# Patient Record
Sex: MALE | Race: WHITE
[De-identification: names, ages, dates, MRNs, and addresses within clinical notes are randomized per-mention and may not be internally consistent; named-entity substitution may affect disease eponyms.]

---

## 2020-04-05 NOTE — RAD
Chest one view



HISTORY: Fever. Dyspnea.



COMPARISON: 11/5/2008.



FINDINGS: Cardiac silhouette and pulmonary vasculature are unremarkable. Mediastinum is midline.



No confluent airspace consolidation or evidence of pneumothorax. Parenchymal markings in the lower lo
bes is similar in appearance to the prior study.



  



IMPRESSION :

No active cardiopulmonary abnormalities are demonstrated.



Reported By: KECIA Schwab 

Electronically Signed:  4/5/2020 2:45 PM

## 2020-07-30 NOTE — CT
CT maxillofacial with contrast:

7/30/2020



HISTORY:

65-year-old male with facial pain and swelling



COMPARISON:

None



FINDINGS:

There is a large soft tissue tumor mass in the left face invading multiple compartments. This include
s the main portion of the mass centered in the left premaxillary superficial soft tissues that

measures approximately 4 x 4 0.5 x 5 cm. It contiguously extends posteriorly deep into the left maxil
alexandria sinus, through a large destructive osseous defect of the anterior wall of the left maxillary

sinus, occupying approximately 50-75% volume of the lateral aspect of the left maxillary sinus. There
 is a large left medial antrostomy defect, with surgical absence of the left inferior turbinate.

Nasal cavity and right maxillary sinus are essentially clear.



From the left maxillary sinus, the mass extends inferiorly, destroying the bony floor of the left max
illary sinus and the posterior left lateral aspect of the hard palate, encroaching upon the

superior lateral aspect of the left oral cavity, where the tumor component is approximately 2 x 1.5 x
 2.5 cm.



There is a prosthetic globe in the left orbit. There is osseous defect at the left inferior orbital w
all laterally. The tumor material from the left maxillary sinus is flush with that orbital floor

defect, minimally encroaching upon the inferior extraconal space..



The tumor mass apparently covers the anterior surface of the prosthetic globe, such that the prosthes
is is probably displaced posteriorly within the orbit.



There is soft tissue edema demonstrated by fat stranding, around the tumor, especially superficially 
in the subcutaneous fat.



Moderate mucosal thickening partially opacifying bilateral ethmoid air cells and left frontal sinus.

No intracranial invasion identified.

Jaws are edentulous.



IMPRESSION:

1. 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
g left maxillary sinus and left orbit, and left oral cavity; and destroying bone.

2. Soft tissue edema surrounding the tumor.

3. Prosthetic left globe.



Reported By: Baudilio Lancaster 

Electronically Signed:  7/30/2020 12:49 PM
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
## 2020-09-02 NOTE — PDOC.HOSPP
- Subjective


Encounter Date: 09/02/20


Encounter Time: 10:30


Subjective: 





Mr. Weinstein was seen today in follow-up of Facial cellulitis. He notes throbbing 

pain in the left side of his face.





- Objective


Vital Signs & Weight: 


 Vital Signs (12 hours)











  Temp Pulse Resp BP Pulse Ox


 


 09/02/20 08:17  97.5 F L  55 L  18  109/53 L  97


 


 09/02/20 05:16      100


 


 09/02/20 04:33  97.4 F L  66  20  113/60  100








 Weight











Weight                         155 lb 9.6 oz














Result Diagrams: 


 09/01/20 23:00





 09/01/20 23:00





Hospitalist ROS





- Medication


Medications: 


Active Medications











Generic Name Dose Route Start Last Admin





  Trade Name Freq  PRN Reason Stop Dose Admin


 


Sodium Chloride  1,000 mls @ 125 mls/hr  09/02/20 03:30  09/02/20 05:36





  Normal Saline 0.9%  IV   1,000 mls





  .Q8H KAT   Administration





     





     





     





     


 


Morphine Sulfate  4 mg  09/02/20 03:28  09/02/20 05:38





  Morphine  SLOW IVP   4 mg





  Q4H PRN   Administration





  Pain   





     





     





     


 


Pantoprazole Sodium  40 mg  09/02/20 09:00  09/02/20 08:32





  Protonix  IVP   40 mg





  DAILY KAT   Administration





     





     





     





     














- Exam


General Appearance: NAD


Eye - other findings: + erythema on the left face, and swelling, enucleation of 

left eye


Heart: RRR, no murmur, no gallops, no rubs, normal peripheral pulses


Respiratory: CTAB, no wheezes, no rales, no ronchi, normal chest expansion


Gastrointestinal: soft, non-tender, non-distended, normal bowel sounds, no 

palpable masses, no hepatomegaly


Extremities: no cyanosis, 1+ LE edema (mild lower extremity edema in both legs)





Hosp A/P


(1) Cellulitis diffuse, face


Code(s): L03.211 - CELLULITIS OF FACE   Status: Acute   





(2) Malignant neoplasm of face


Code(s): C76.0 - MALIGNANT NEOPLASM OF HEAD, FACE AND NECK   Status: Acute   





(3) Tobacco abuse


Code(s): Z72.0 - TOBACCO USE   Status: Chronic   





- Plan





* Cellulitis of the face- he has had failed outpatient treatment with Augmentin 

and Doxycycline


* Continue Cefepime and Vancomycin' ENT has been consulted


* He normally receives treatment at [facility]. His ENT there is Dr. White. He would 

like transfer to [facility]- I have called the transfer center to initiate transfer


* Tobacco Abuse- continued use

## 2020-09-03 NOTE — CON
DATE OF CONSULTATION:  



REASON FOR CONSULTATION:  This 65-year-old gentleman we are consulted on for

evaluation of a large nasal mass. 



BRIEF HISTORY:  This is a gentleman who had a history of recurrent inverting

papilloma.  This causes severe facial deformity and has even required removal 
of the

left orbit along with major craniofacial surgery 2 prior times.  He reports 
being

followed in town by Dr. White who has communicated him by phone over the past 
day

regarding his condition.  His facial mass and pain have become significantly 
worse

since February.  Normally, the patient is treated and has his surgical 
procedures at

[facility].  However, the patient was admitted for severe pain 
exacerbation.

He is currently COVID positive and transfer to [place] has been pending likely

related to his COVID status. 



PHYSICAL EXAMINATION:

The patient is resting comfortably in bed.  He has left orbital exoneration 
along

with obvious subcutaneous tumor growth of the left cheek area causing overlying

redness and swelling.  Left nasal cavity  large friable nasal mass. 



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

area.  The patient is already currently under the care of Dr. Maico White in 
town.

 At this time in the hospital, I recommend pain control and oral antibiotics.  
The

patient will likely need to be discharged pending his COVID status and to 
follow up

with [facility] via a regularly scheduled appointment as

soon as possible pending his negative COVID status.  However, if his symptoms

continue to worsen, no operative procedure we could offer him at this 
institution.

Please do not hesitate to contact us for any questions, [phone]. 







Job ID:  [id number]



MTDD

## 2020-09-03 NOTE — PDOC.HOSPP
- Subjective


Encounter Date: 09/03/20


Encounter Time: 14:09


Subjective: 





Mr. Weinstein was seen today in follow-up of cellulitis of the face. He notes some 

continued pain, which he says Norco is not sufficient.  His COVID screen came 

back positive. He denies any symptoms, including chest pain, shortness of 

breath diarrhea ect. He says he had symptoms like this a few weeks ago, but 

tested negative at that time.





- Objective


Vital Signs & Weight: 


 Vital Signs (12 hours)











  Temp Pulse Resp BP Pulse Ox


 


 09/03/20 12:00  97.5 F L  70  18  99/53 L  96


 


 09/03/20 08:44  98.1 F  68  18  95/58 L  93 L


 


 09/03/20 08:05  98.1 F  68  18  95/58 L  93 L


 


 09/03/20 08:00      93 L


 


 09/03/20 04:00  98.2 F  64  18  95/57 L  96








 Weight











Admit Weight                   155 lb 9.6 oz


 


Weight                         155 lb 9.6 oz














I&O: 


 











 09/02/20 09/03/20 09/04/20





 06:59 06:59 06:59


 


Intake Total  1850 400


 


Output Total   1075


 


Balance  1850 -675











Result Diagrams: 


 09/03/20 05:39





 09/03/20 05:39





Hospitalist ROS





- Medication


Medications: 


Active Medications











Generic Name Dose Route Start Last Admin





  Trade Name Freq  PRN Reason Stop Dose Admin


 


Cefepime HCl 1 gm/ Sodium  100 mls @ 200 mls/hr  09/02/20 15:00  09/03/20 03:33





  Chloride  IVPB   100 mls





  0300,1500 KAT   Administration





     





     





     





     


 


Sodium Chloride  1,000 mls @ 125 mls/hr  09/02/20 03:30  09/03/20 12:12





  Normal Saline 0.9%  IV   1,000 mls





  .Q8H KAT   Administration





     





     





     





     


 


Vancomycin HCl 1.25 gm/ Sodium  250 mls @ 166.667 mls/hr  09/03/20 11:00  09/03/ 20 12:12





  Chloride  IVPB   250 mls





  1100,2300 KAT   Administration





     





     





     





     


 


Morphine Sulfate  4 mg  09/02/20 03:28  09/03/20 09:39





  Morphine  SLOW IVP   4 mg





  Q4H PRN   Administration





  Pain   





     





     





     


 


Pantoprazole Sodium  40 mg  09/02/20 09:00  09/03/20 09:29





  Protonix  IVP   40 mg





  DAILY KAT   Administration





     





     





     





     














- Exam


ENT - other findings: + erythema on the left side of the face, and some swelling


Heart: RRR, no murmur, no gallops, no rubs, normal peripheral pulses


Respiratory: CTAB, no wheezes, no rales, no ronchi, normal chest expansion


Gastrointestinal: soft, non-tender, non-distended, normal bowel sounds, no 

palpable masses, no hepatomegaly


Extremities: no cyanosis, no edema





Hosp A/P


(1) Cellulitis diffuse, face


Code(s): L03.211 - CELLULITIS OF FACE   Status: Acute   





(2) Malignant neoplasm of face


Code(s): C76.0 - MALIGNANT NEOPLASM OF HEAD, FACE AND NECK   Status: Acute   





(3) Tobacco abuse


Code(s): Z72.0 - TOBACCO USE   Status: Chronic   





- Plan





* Cellulitis of the face- Continue Cefepime and Vancomycin


* He will continue with treatment for the cellulitis here, and then once 

improved, he can be dicsharged and follow-up with ENT in [place] at [facility]


* COVID infection- unclear if he is an asymptomatic acute infection, vs. a 

resolved infection, with persistent viral shedding. He has been placed in 

Respiratory isolation


* Tobacco Abuse- continued use

## 2020-09-04 NOTE — PDOC.HOSPP
- Subjective


Encounter Date: 09/04/20


Encounter Time: 15:38


Subjective: 





Mr. Weinstein was seen today in follow-up of cellulitis of the face. He is beginning 

to feel better. No new complaints.





- Objective


Vital Signs & Weight: 


 Vital Signs (12 hours)











  Temp Pulse Resp BP Pulse Ox


 


 09/04/20 08:00      96


 


 09/04/20 04:00  97.9 F  74  18  111/61  96








 Weight











Admit Weight                   155 lb 9.6 oz


 


Weight                         155 lb 9.6 oz














I&O: 


 











 09/03/20 09/04/20 09/05/20





 06:59 06:59 06:59


 


Intake Total 1850 4990 


 


Output Total  4475 1300


 


Balance 1850 515 -1300











Result Diagrams: 


 09/03/20 05:39





 09/03/20 05:39





Hospitalist ROS





- Medication


Medications: 


Active Medications











Generic Name Dose Route Start Last Admin





  Trade Name Freq  PRN Reason Stop Dose Admin


 


Cefepime HCl 1 gm/ Sodium  100 mls @ 200 mls/hr  09/02/20 15:00  09/04/20 14:06





  Chloride  IVPB   100 mls





  0300,1500 KAT   Administration





     





     





     





     


 


Sodium Chloride  1,000 mls @ 125 mls/hr  09/02/20 03:30  09/04/20 11:21





  Normal Saline 0.9%  IV   1,000 mls





  .Q8H KAT   Administration





     





     





     





     


 


Vancomycin HCl 1.25 gm/ Sodium  250 mls @ 166.667 mls/hr  09/03/20 11:00  09/04/ 20 11:20





  Chloride  IVPB   250 mls





  1100,2300 KAT   Administration





     





     





     





     


 


Morphine Sulfate  4 mg  09/02/20 03:28  09/04/20 10:13





  Morphine  SLOW IVP   4 mg





  Q4H PRN   Administration





  Pain   





     





     





     


 


Pantoprazole Sodium  40 mg  09/02/20 09:00  09/04/20 09:12





  Protonix  IVP   40 mg





  DAILY KAT   Administration





     





     





     





     














- Exam


Eye - other findings: + left sided facial swelling


Heart: RRR, no murmur, no gallops, no rubs, normal peripheral pulses


Respiratory: CTAB, no wheezes, no rales, no ronchi, normal chest expansion





Hosp A/P


(1) Cellulitis diffuse, face


Code(s): L03.211 - CELLULITIS OF FACE   Status: Acute   





(2) Malignant neoplasm of face


Code(s): C76.0 - MALIGNANT NEOPLASM OF HEAD, FACE AND NECK   Status: Acute   





(3) Tobacco abuse


Code(s): Z72.0 - TOBACCO USE   Status: Chronic   





- Plan





* Cellulitis of the face- he can be transitioned back to oral antibiotics


* Stable for discharge home
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
## 2020-10-09 NOTE — CT
CT Facial Bones W Con



History: Draining tumor



Comparison: CT July 30, 2020



Findings: Prosthetic left globe. The malignancy of the left infraorbital soft tissues has enlarged wi
th central necrosis with headaches sinus tract to the skin. Mass has communication of the nasal

cavity.



Using the same plane of reference the greatest transverse dimension is 5.8 cm, previously 4.5 cm. Inv
olvement of the left orbit soft tissues has mildly progressed. The tumor invades through the left

anterior maxillary wall and the left maxillary floor involving the left maxillary alveolar bone.



Impression: Slightly enlarging and progressively invading left facial mass with developing central ne
crosis which has a sinus tract to the skin surface.



Reported By: Doug Rojo 

Electronically Signed:  10/9/2020 9:20 PM

## 2020-10-21 NOTE — CT
EXAM:  CT Facial Bones W Con



DATE:  10/21/2020 8:55 PM



INDICATION:  Worsening facial swelling and drainage



COMPARISON:  Prior exam dated October 9, 2020



FINDING:  The large central necrotic left infraorbital soft tissue mass invading the left maxillary s
inus, left aspect of the oral cavity and left  space is relatively stable. Draining sinus

is seen communicating with the anterior left cheek. Prosthetic left globe is stable. Intracranial con
tents are within normal limits. Postprocedural change of the left maxillary sinus and left nasal

canal are stable.





IMPRESSION:Stable large invasive left infraorbital facial soft tissue mass with a draining sinus trac
t to the anterior left cheek skin.

 



Reported By: Elijah Frazier 

Electronically Signed:  10/21/2020 9:15 PM

## 2020-10-28 NOTE — CT
PRELIMINARY REPORT/[facility]/EMERGENCY AFTER HOURS PROCEDURE



EXAM: 

CTA Chest with Intravenous Contrast 



CLINICAL HISTORY: 

Dyspnea, recent admission, Cancer 



TECHNIQUE: 

Axial CTA images of the chest with intravenous contrast. Three-dimensional MIP/volume rendered reform
ations were performed. 



CONTRAST: 

With; ISOVUE 370,100mL 



COMPARISON: 

None provided. 



FINDINGS: 



PULMONARY ARTERIES 

Dilatation of the main pulmonary artery measuring up to 4.3 cm in diameter.  There is no intraluminal
 filling defect suspicious for PE. 



AORTA 

Dilatation of the ascending aorta measuring up to 4.3 cm.  Atherosclerosis. 



LUNGS 

Centrilobular emphysematous changes.  No pulmonary mass. No focal airspace consolidation. 



PLEURAL SPACES 

No pleural effusion. No pneumothorax. 



HEART AND MEDIASTINUM 

No cardiomegaly. No significant pericardial effusion.  The coronary arteries are calcified. 



LYMPH NODES 

No lymphadenopathy. 



BONES 

No focal osseous abnormality or acute fracture.  Multilevel degenerative disc disease. 



CHEST WALL AND UPPER ABDOMEN 

9 mm hypodense lesion in the right lobe of the liver that is too small to characterize by CT criteria
.  The chest wall is unremarkable. 



IMPRESSION: 



No evidence of a pulmonary embolism.  No identified acute chest abnormality. 



Centrilobular emphysematous changes.  Enlarged main pulmonary artery suggestive of pulmonary artery h
ypertension. 



Coronary artery disease. 



Ectasia of the ascending aorta.



 

ELECTRONICALLY SIGNED BY:

Earnest Cordoba MD

Oct 28, 2020 12:15:38 AM CDT



This report is intended for review by the ordering physician only, in accordance of law. If you recei
ve this report in error, please call [facility] at [phone].









FINAL REPORT 



Exam: CT angiogram of the chest



HISTORY: Cancer. Dyspnea.



COMPARISON: None





TECHNIQUE: CT angiogram of the chest is performed in the axial plane. Three-dimensional reformatted i
mages are submitted for interpretation



FINDINGS:

Mediastinum: No mass, lymphadenopathy or hematoma.



HEART: Normal size. No significant pericardial fluid. 

Aorta: No aneurysm or dissection 

Upper solid abdominal viscera: Indeterminate hypodensities in the hepatic parenchyma.

Trachea and central bronchi: Patent

Pleural spaces: No effusion



Lung parenchyma: Extensive emphysematous changes. There are large blebs and bulla predominantly in th
e upper lobes. Irregular marginated hypodensity in the right lung apex is presumed to represent

scar.

Pneumothorax: None

Osseous structures: No lytic or blastic lesions



Pulmonary arteries: Adequate contrast opacification pulmonary arterial system to the level of segment
al arteries. No filling defect to suggest pulmonary embolism. Enlarged central pulmonary artery,

consistent with pulmonary artery hypertension.



IMPRESSION:

1. This report is in agreement with initial report by [facility].

2. Extensive emphysematous changes.

3. No evidence of pulmonary artery  embolism to the level of the segmental arteries. There is pulmona
ry arterial hypertension.



Code QA



Transcribed Date/Time: 10/28/2020 7:48 AM



Reported By: Roby Lopez 

Electronically Signed:  10/28/2020 8:55 AM

## 2020-11-14 NOTE — CT
EXAM: CT face without contrast



HISTORY: Fall with facial trauma. Large left facial tumor



COMPARISON: CT face 10/21/2020



TECHNIQUE: Multiple contiguous axial images were obtained and a CT of the face without contrast. Sagi
ttal and coronal reformats were performed.



FINDINGS: No acute facial fractures are identified. There is stable remodeling of the left maxillary 
and zygomatic bones with absence of the anterior and medial walls of the left maxillary sinus.

There is a large soft tissue density mass emanating from the anterior aspect of the left cheek and ex
tending upward towards the orbit. There is periorbital soft tissue swelling which most likely

represents a mass but small amount of periorbital contusion is also a possibility. 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
cial left globe. The right globe is unremarkable. 



Mucosal thickening is seen in the left maxillary sinus, left frontal sinus, and bilateral ethmoid air
 cells. The sphenoid sinuses are well aerated. The mastoid air cells are well aerated.. 



Visualized intracranial structures are unremarkable. 



IMPRESSION: 

1. No evidence of acute facial fracture

2. Large stable periorbital/sinus/facial mass.



Reported By: Alcides Lopez 

Electronically Signed:  11/14/2020 9:31 PM

## 2020-11-14 NOTE — CT
EXAM: CT brain without contrast



HISTORY: Fall with left facial trauma



COMPARISON: CT face 10/21/2020



TECHNIQUE: Multiple contiguous axial images were obtained and a CT of the brain without contrast.



FINDINGS: There are scattered hypodensities in the subcortical and periventricular white matter consi
stent with small vessel ischemic disease. There is no evidence of hydrocephalus, intracranial

hemorrhage, or extra-axial fluid collection.



Moderate left periorbital soft tissue swelling is seen. The soft tissue swelling may represent a mass
 and/or hematoma. 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 Mucosal thickening is seen in the ethmoid air

cells and left frontal and maxillary sinuses. The patient appears to have had prior left maxillary si
nus surgery. The mastoid air cells are well aerated. Multiple lucencies in the left zygomatic bone

likely represent remote fractures.



IMPRESSION: No evidence of acute intracranial abnormality



Reported By: Alcides Lopez 

Electronically Signed:  11/14/2020 9:25 PM

## 2020-11-14 NOTE — RAD
CHEST ONE VIEW:

11/14/20

 

COMPARISON: 

4/5/20.

 

HISTORY: 

Fall. Pain. 

 

FINDINGS: 

Normal cardiac silhouette. The pulmonary vessels and hilum are normal. Costophrenic angles are clear.
 Chronic changes, without consolidation or mass. No pneumothorax or acute osseous abnormalities. 

 

IMPRESSION: 

No acute cardiopulmonary process. 

 

POS: PPP

## 2020-11-30 ENCOUNTER — HOSPITAL ENCOUNTER (INPATIENT)
Dept: HOSPITAL 92 - ERS | Age: 65
LOS: 3 days | Discharge: HOME | DRG: 844 | End: 2020-12-03
Attending: INTERNAL MEDICINE | Admitting: INTERNAL MEDICINE
Payer: SELF-PAY

## 2020-11-30 VITALS — BODY MASS INDEX: 23.9 KG/M2

## 2020-11-30 DIAGNOSIS — F15.10: ICD-10-CM

## 2020-11-30 DIAGNOSIS — F17.290: ICD-10-CM

## 2020-11-30 DIAGNOSIS — D36.7: Primary | ICD-10-CM

## 2020-11-30 DIAGNOSIS — Z79.899: ICD-10-CM

## 2020-11-30 DIAGNOSIS — D64.9: ICD-10-CM

## 2020-11-30 DIAGNOSIS — D72.829: ICD-10-CM

## 2020-11-30 DIAGNOSIS — K59.00: ICD-10-CM

## 2020-11-30 DIAGNOSIS — Z20.828: ICD-10-CM

## 2020-11-30 DIAGNOSIS — Z79.82: ICD-10-CM

## 2020-11-30 DIAGNOSIS — R13.10: ICD-10-CM

## 2020-11-30 DIAGNOSIS — J44.9: ICD-10-CM

## 2020-11-30 DIAGNOSIS — Z59.0: ICD-10-CM

## 2020-11-30 DIAGNOSIS — Z79.1: ICD-10-CM

## 2020-11-30 DIAGNOSIS — L03.211: ICD-10-CM

## 2020-11-30 LAB
ALBUMIN SERPL BCG-MCNC: 3.5 G/DL (ref 3.4–4.8)
ALP SERPL-CCNC: 112 U/L (ref 40–110)
ALT SERPL W P-5'-P-CCNC: 10 U/L (ref 8–55)
ANION GAP SERPL CALC-SCNC: 13 MMOL/L (ref 10–20)
AST SERPL-CCNC: 13 U/L (ref 5–34)
BASOPHILS # BLD AUTO: 0.1 THOU/UL (ref 0–0.2)
BASOPHILS NFR BLD AUTO: 0.5 % (ref 0–1)
BILIRUB SERPL-MCNC: 0.2 MG/DL (ref 0.2–1.2)
BUN SERPL-MCNC: 10 MG/DL (ref 8.4–25.7)
CALCIUM SERPL-MCNC: 9.7 MG/DL (ref 7.8–10.44)
CHLORIDE SERPL-SCNC: 105 MMOL/L (ref 98–107)
CO2 SERPL-SCNC: 22 MMOL/L (ref 23–31)
CREAT CL PREDICTED SERPL C-G-VRATE: 0 ML/MIN (ref 70–130)
EOSINOPHIL # BLD AUTO: 0.8 THOU/UL (ref 0–0.7)
EOSINOPHIL NFR BLD AUTO: 5.8 % (ref 0–10)
GLOBULIN SER CALC-MCNC: 3.9 G/DL (ref 2.4–3.5)
GLUCOSE SERPL-MCNC: 108 MG/DL (ref 80–115)
HGB BLD-MCNC: 10.7 G/DL (ref 14–18)
LYMPHOCYTES # BLD: 2.1 THOU/UL (ref 1.2–3.4)
LYMPHOCYTES NFR BLD AUTO: 14.9 % (ref 21–51)
MCH RBC QN AUTO: 27.1 PG (ref 27–31)
MCV RBC AUTO: 86.9 FL (ref 78–98)
MONOCYTES # BLD AUTO: 0.7 THOU/UL (ref 0.11–0.59)
MONOCYTES NFR BLD AUTO: 4.6 % (ref 0–10)
NEUTROPHILS # BLD AUTO: 10.6 THOU/UL (ref 1.4–6.5)
NEUTROPHILS NFR BLD AUTO: 74.3 % (ref 42–75)
PLATELET # BLD AUTO: 835 THOU/UL (ref 130–400)
POTASSIUM SERPL-SCNC: 3.8 MMOL/L (ref 3.5–5.1)
RBC # BLD AUTO: 3.94 MILL/UL (ref 4.7–6.1)
SODIUM SERPL-SCNC: 136 MMOL/L (ref 136–145)
WBC # BLD AUTO: 14.3 THOU/UL (ref 4.8–10.8)

## 2020-11-30 PROCEDURE — 83605 ASSAY OF LACTIC ACID: CPT

## 2020-11-30 PROCEDURE — 96376 TX/PRO/DX INJ SAME DRUG ADON: CPT

## 2020-11-30 PROCEDURE — 80053 COMPREHEN METABOLIC PANEL: CPT

## 2020-11-30 PROCEDURE — 74176 CT ABD & PELVIS W/O CONTRAST: CPT

## 2020-11-30 PROCEDURE — 96367 TX/PROPH/DG ADDL SEQ IV INF: CPT

## 2020-11-30 PROCEDURE — 96365 THER/PROPH/DIAG IV INF INIT: CPT

## 2020-11-30 PROCEDURE — 80202 ASSAY OF VANCOMYCIN: CPT

## 2020-11-30 PROCEDURE — 96375 TX/PRO/DX INJ NEW DRUG ADDON: CPT

## 2020-11-30 PROCEDURE — 87040 BLOOD CULTURE FOR BACTERIA: CPT

## 2020-11-30 PROCEDURE — 85025 COMPLETE CBC W/AUTO DIFF WBC: CPT

## 2020-11-30 PROCEDURE — 87635 SARS-COV-2 COVID-19 AMP PRB: CPT

## 2020-11-30 PROCEDURE — 80048 BASIC METABOLIC PNL TOTAL CA: CPT

## 2020-11-30 PROCEDURE — U0003 INFECTIOUS AGENT DETECTION BY NUCLEIC ACID (DNA OR RNA); SEVERE ACUTE RESPIRATORY SYNDROME CORONAVIRUS 2 (SARS-COV-2) (CORONAVIRUS DISEASE [COVID-19]), AMPLIFIED PROBE TECHNIQUE, MAKING USE OF HIGH THROUGHPUT TECHNOLOGIES AS DESCRIBED BY CMS-2020-01-R: HCPCS

## 2020-11-30 PROCEDURE — 36415 COLL VENOUS BLD VENIPUNCTURE: CPT

## 2020-11-30 PROCEDURE — 96366 THER/PROPH/DIAG IV INF ADDON: CPT

## 2020-11-30 RX ADMIN — HEPARIN SODIUM SCH: 5000 INJECTION, SOLUTION INTRAVENOUS; SUBCUTANEOUS at 21:40

## 2020-11-30 RX ADMIN — HYDROCODONE BITARTRATE AND ACETAMINOPHEN PRN TAB: 5; 325 TABLET ORAL at 21:39

## 2020-11-30 SDOH — ECONOMIC STABILITY - HOUSING INSECURITY: HOMELESSNESS: Z59.0

## 2020-11-30 NOTE — CT
CT Abdomen Pelvis WO Con

11/30/2020 5:05 PM



HISTORY:

Abdominal pain. Chronic constipation.



COMPARISON:

CTA thorax on 10/27/2020



Technique: 

Multiple contiguous axial CT images are obtained through the abdomen and pelvis without IV contrast. 
Coronal reformats are provided.



FINDINGS:

This examination is limited for the evaluation of solid organs and vascular structures due to the lac
k of intravenous contrast.



Lower Chest: Linear bibasilar densities suggesting areas of mild scarring with associated emphysemato
us changes.



Liver: A 1.7 cm hypodense lesion is seen in the caudate lobe of the liver, a 1.2 cm hypodense lesion 
in the anterior aspect left hepatic lobe, subcentimeter too small to characterize hypodense lesion

in the dome of the liver, and a few additional small subcentimeter hypodense lesions in the left hepa
tic lobe which are stable compared to prior CTA chest are difficult to characterize on this

nonenhanced CT exam.

Gallbladder: Within normal limits for CT imaging.

Pancreas: Grossly normal nonenhanced CT appearance.

Spleen: Grossly normal nonenhanced CT appearance.

Adrenals: Grossly normal nonenhanced CT appearance.

Kidneys, ureters, urinary bladder: No renal or ureteral calculi are seen bilaterally.  Urinary bladde
r has a normal CT appearance.





Reproductive Organs: Prostate gland is enlarged measuring 5 cm in transverse dimensions.



Lymph Nodes: No enlarged lymph nodes are seen by CT size criteria.



Bowel: Small amount retained fecal material is seen throughout the colon.

Appendix: The appendix is normal in caliber. 



Peritoneum: No free fluid, free air, or  fluid collection.

Retroperitoneum: within normal limits.



Vessels: Vascular calcifications are seen in the abdominal aorta and iliac arteries. A retroaortic le
ft renal vein is seen..



Abdominal Wall: Small fat-containing umbilical hernia.

Bones: Degenerative changes present lower lumbar spine. No suspicious lytic or sclerotic osseous lesi
ons are identified.  



IMPRESSION:

1. No renal or ureteral calculi are seen bilaterally.

2. No CT evidence of appendicitis.

3. Scattered hypodense hepatic lesions which are difficult to characterize on this nonenhanced CT exa
m. Findings could be related to hepatic cysts. Cystic metastatic lesions would be difficult to

entirely exclude given multiplicity.

4. Small fat-containing umbilical hernia.

5. Small amount retained fecal material throughout the colon.



Reported By: Fredo Zelaya 

Electronically Signed:  11/30/2020 5:26 PM

## 2020-11-30 NOTE — PDOC.HHP
Hospitalist HPI





- History of Present Illness


Worsening left facial pain


History of Present Illness: 


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 

c/o o worsening facial pain. 





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 

in [facility]. He has apparently had 2 facial surgeries. He had been following with a 

Dr White in [place] while in [facility], but has not not been able to follow up due

to being homeless and having no insurance.





His facial tumors have however been getting bigger, now causing constant HA, 

difficult in eating and swallowing and more pain. He presented to the ER where 

he is noted with open ulcers on the facial tumors.  Pt also c/o severe 

constipation leading to self manual disimpaction. 





He has been admitted for abx, ENT and ONC eval and /CM to assist with possible

insurance eligibility assistance. 





Hospitalist ROS





- Review of Systems


Constitutional: denies: fever, chills, sweats, weakness, malaise, other


Eyes: reports: pain, vision change.  denies: conjunctivae inflammation, eyelid 

inflammation, redness, other


ENT: reports: nose pain, mouth pain, mouth swelling


Respiratory: denies: cough, dry, shortness of breath, hemoptysis, SOB with 

excertion, pleuritic pain, sputum, wheezing, other


Cardiovascular: denies: chest pain, palpitations, orthopnea, paroxysmal noc. 

dyspnea, edema, light headedness, other


Gastrointestinal: reports: constipation.  denies: nausea, vomiting, abdominal 

pain, diarrhea, melena, hematochezia, other


Genitourinary: denies: dysuria, frequency, incontinence, hematuria, retention, 

other


Musculoskeletal: denies: neck pain, shoulder pain, arm pain, back pain, hand 

pain, leg pain, foot pain, other


Skin: denies: rash, lesions, ivonne, bruising, other





Hospitalist History





- Past Medical History


Pulmonary: reports: COPD


Gastrointestinal: reports: Constipation


Psych: reports: Addictions





- Past Surgical History


Past Surgical History: reports: Other


Other Surgical History: 





facial surgery





- Family History


Family History: reports: Other (Cirrhosis is father)





- Social History


Smoking Status: Current every day smoker


Tobacco Type: cigars


Drugs: reports: methamphetamine


Living Situation: Homeless


Activity level: independent ambulation





- Exam


General Appearance: awake alert


General - other findings: Pt has a VERY large tumor over L side of face. 

Swelling is red, w 3 ulcers


Eye: PERRL


Eye - other findings: left eye is enucleated and compltely covered by tumor


ENT - other findings: Left side of face is pushed away by tumor.


Neck: no JVD


Heart: RRR, no murmur, no gallops


Respiratory: CTAB, no wheezes, no rales, no ronchi, normal chest expansion


Gastrointestinal: soft, non-tender, non-distended, normal bowel sounds


Extremities: no clubbing, no edema


Skin: normal turgor, no lesions


Neurological: cranial nerve grossly intact, no focal deficits


Neurological - other findings: Facial findisngs as documented.


Musculoskeletal: normal strength, no muscle wasting


Psychiatric: normal affect, normal behavior, A&O x 3





Hospitalist Results





- Labs


Result Diagrams: 


                                 11/30/20 10:47





                                 11/30/20 10:47


Lab results: 


                                        











WBC  14.3 thou/uL (4.8-10.8)  H  11/30/20  10:47    


 


Hgb  10.7 g/dL (14.0-18.0)  L  11/30/20  10:47    


 


Hct  34.2 % (42.0-52.0)  L  11/30/20  10:47    


 


MCV  86.9 fL (78.0-98.0)   11/30/20  10:47    


 


Plt Count  835 thou/uL (130-400)  H  11/30/20  10:47    


 


Neutrophils %  74.3 % (42.0-75.0)   11/30/20  10:47    


 


Sodium  136 mmol/L (136-145)   11/30/20  10:47    


 


Potassium  3.8 mmol/L (3.5-5.1)   11/30/20  10:47    


 


Chloride  105 mmol/L ()   11/30/20  10:47    


 


Carbon Dioxide  22 mmol/L (23-31)  L  11/30/20  10:47    


 


BUN  10 mg/dL (8.4-25.7)   11/30/20  10:47    


 


Creatinine  0.87 mg/dL (0.7-1.3)   11/30/20  10:47    


 


Glucose  108 mg/dL ()   11/30/20  10:47    


 


Lactic Acid  0.9 mmol/L (0.5-2.2)   11/30/20  11:37    


 


Calcium  9.7 mg/dL (7.8-10.44)   11/30/20  10:47    


 


Total Bilirubin  0.2 mg/dL (0.2-1.2)   11/30/20  10:47    


 


AST  13 U/L (5-34)   11/30/20  10:47    


 


ALT  10 U/L (8-55)   11/30/20  10:47    


 


Alkaline Phosphatase  112 U/L ()  H  11/30/20  10:47    


 


Serum Total Protein  7.4 g/dL (5.8-8.1)   11/30/20  10:47    


 


Albumin  3.5 g/dL (3.4-4.8)   11/30/20  10:47    














Hospitalist H&P A/P





- Problem


(1) Anemia


Code(s): D64.9 - ANEMIA, UNSPECIFIED   Status: Acute   


Assessment and Plan: 


Likely due to malignancy. Monitor Hg for now.








(2) Leucocytosis


Code(s): D72.829 - ELEVATED WHITE BLOOD CELL COUNT, UNSPECIFIED   Status: Acute 

 


Qualifiers: 


   Hypereosinophilic syndrome type: idiopathic 





(3) Constipation


Code(s): K59.00 - CONSTIPATION, UNSPECIFIED   Status: Acute   


Assessment and Plan: 


Unclear etiology. Will start daily miralax. Also get a CT A?P to r/o acute 

abnormality given facial malignancy. 








(4) Cellulitis diffuse, face


Code(s): L03.211 - CELLULITIS OF FACE   Status: Acute   


Assessment and Plan: 


Cont current abx. Monitor for symp improvement.








(5) Malignant neoplasm of face


Code(s): C76.0 - MALIGNANT NEOPLASM OF HEAD, FACE AND NECK   Status: Acute   


Assessment and Plan: 


Have consulted ENT and ONC. Will await the recs. Control pain. 








(6) Tobacco abuse


Code(s): Z72.0 - TOBACCO USE   Status: Chronic   


Assessment and Plan: 


Has been counseled. 








(7) Head ache


Code(s): R51.9 - HEADACHE, UNSPECIFIED   Status: Acute   


Qualifiers: 


   Headache chronicity pattern: acute headache 


Assessment and Plan: 


Likely due to facial tumor. Will give Fioricet. Pt had a CT brain early this 

month w no acute findings. Will defer further w/o/imaging to ONC.








(8) Homeless


Code(s): Z59.0 - HOMELESSNESS   Status: Acute   


Assessment and Plan: 


SW has been consulted.








(9) Dysphagia


Code(s): R13.10 - DYSPHAGIA, UNSPECIFIED   Status: Acute   


Assessment and Plan: 


From facial tumor. Have consulted Speech Therapy. 








- Plan


Plan: 


PPx: Heparin.





CODE: FULL.





Dispo: Admit as inpatient. ENT, ONC, SW, CM and ST consulted.

## 2020-12-01 LAB
ALBUMIN SERPL BCG-MCNC: 2.8 G/DL (ref 3.4–4.8)
ALP SERPL-CCNC: 85 U/L (ref 40–110)
ALT SERPL W P-5'-P-CCNC: 8 U/L (ref 8–55)
ANION GAP SERPL CALC-SCNC: 13 MMOL/L (ref 10–20)
AST SERPL-CCNC: 9 U/L (ref 5–34)
BASOPHILS # BLD AUTO: 0.1 THOU/UL (ref 0–0.2)
BASOPHILS NFR BLD AUTO: 0.7 % (ref 0–1)
BILIRUB SERPL-MCNC: (no result) MG/DL (ref 0.2–1.2)
BUN SERPL-MCNC: 10 MG/DL (ref 8.4–25.7)
CALCIUM SERPL-MCNC: 8.6 MG/DL (ref 7.8–10.44)
CHLORIDE SERPL-SCNC: 105 MMOL/L (ref 98–107)
CO2 SERPL-SCNC: 22 MMOL/L (ref 23–31)
CREAT CL PREDICTED SERPL C-G-VRATE: 87 ML/MIN (ref 70–130)
EOSINOPHIL # BLD AUTO: 0.8 THOU/UL (ref 0–0.7)
EOSINOPHIL NFR BLD AUTO: 6.9 % (ref 0–10)
GLOBULIN SER CALC-MCNC: 3 G/DL (ref 2.4–3.5)
GLUCOSE SERPL-MCNC: 106 MG/DL (ref 80–115)
HGB BLD-MCNC: 8.5 G/DL (ref 14–18)
LYMPHOCYTES # BLD: 2.3 THOU/UL (ref 1.2–3.4)
LYMPHOCYTES NFR BLD AUTO: 20 % (ref 21–51)
MCH RBC QN AUTO: 27.1 PG (ref 27–31)
MCV RBC AUTO: 87.3 FL (ref 78–98)
MONOCYTES # BLD AUTO: 0.9 THOU/UL (ref 0.11–0.59)
MONOCYTES NFR BLD AUTO: 7.5 % (ref 0–10)
NEUTROPHILS # BLD AUTO: 7.3 THOU/UL (ref 1.4–6.5)
NEUTROPHILS NFR BLD AUTO: 64.8 % (ref 42–75)
PLATELET # BLD AUTO: 663 THOU/UL (ref 130–400)
POTASSIUM SERPL-SCNC: 4.2 MMOL/L (ref 3.5–5.1)
RBC # BLD AUTO: 3.13 MILL/UL (ref 4.7–6.1)
SODIUM SERPL-SCNC: 136 MMOL/L (ref 136–145)
WBC # BLD AUTO: 11.3 THOU/UL (ref 4.8–10.8)

## 2020-12-01 RX ADMIN — VANCOMYCIN HYDROCHLORIDE SCH MLS: 1 INJECTION, SOLUTION INTRAVENOUS at 12:09

## 2020-12-01 RX ADMIN — VANCOMYCIN HYDROCHLORIDE SCH MLS: 1 INJECTION, SOLUTION INTRAVENOUS at 00:10

## 2020-12-01 RX ADMIN — HEPARIN SODIUM SCH: 5000 INJECTION, SOLUTION INTRAVENOUS; SUBCUTANEOUS at 21:34

## 2020-12-01 RX ADMIN — ALUMINUM HYDROXIDE AND MAGNESIUM HYDROXIDE PRN ML: 200; 200 SUSPENSION ORAL at 21:35

## 2020-12-01 RX ADMIN — HEPARIN SODIUM SCH: 5000 INJECTION, SOLUTION INTRAVENOUS; SUBCUTANEOUS at 07:55

## 2020-12-01 RX ADMIN — HYDROCODONE BITARTRATE AND ACETAMINOPHEN PRN TAB: 5; 325 TABLET ORAL at 07:52

## 2020-12-01 RX ADMIN — ALUMINUM HYDROXIDE AND MAGNESIUM HYDROXIDE PRN ML: 200; 200 SUSPENSION ORAL at 14:18

## 2020-12-01 NOTE — PDOC.HOSPP
- Subjective


Encounter Date: 12/01/20


Encounter Time: 17:04


Subjective: 


Patient seen for follow-up regarding facial tumor.  He reports pain in the face.





- Objective


Vital Signs & Weight: 


                             Vital Signs (12 hours)











  Temp Pulse Resp BP BP Pulse Ox


 


 12/01/20 17:01  97.3 F L  77  18   98/60  94 L


 


 12/01/20 11:59  98.4 F  68  17  110/70   96


 


 12/01/20 07:46  98 F  66  18  113/71   99








                                     Weight











Admit Weight                   167 lb


 


Weight                         167 lb














I&O: 


                                        











 11/30/20 12/01/20 12/02/20





 06:59 06:59 06:59


 


Intake Total   480


 


Balance   480











Result Diagrams: 


                                 12/01/20 06:08





                                 12/01/20 06:08


Additional Labs: 


I reviewed patient's labs and MAR





Hospitalist ROS





- Review of Systems


ENT: reports: other (Pain over the right side of face)


Respiratory: denies: cough, shortness of breath, SOB with excertion, pleuritic 

pain, wheezing


Cardiovascular: denies: chest pain, palpitations, orthopnea, paroxysmal noc. 

dyspnea, edema, light headedness





- Medication


Medications: 


Active Medications











Generic Name Dose Route Start Last Admin





  Trade Name Freq  PRN Reason Stop Dose Admin


 


Hydrocodone Bitart/Acetaminophen  1 tab  11/30/20 16:33  12/01/20 07:52





  Hydrocodone/Acetaminophen 5/325 Mg Tablet  PO   1 tab





  Q4H PRN   Administration





  Moderate Pain (4-6)  


 


Al Hydroxide/Mg Hydroxide 60  0 ml  12/01/20 09:02  12/01/20 14:18





ml/ Lidocaine HCl 30 ml/  SSW   10 ml





Diphenhydramine HCl 75 mg  Q6H PRN   Administration





  Mouth Irritation  


 


Famotidine  20 mg  11/30/20 21:00  12/01/20 07:55





  Famotidine 20 Mg Tab  PO   20 mg





  BID KAT   Administration


 


Heparin Sodium (Porcine)  5,000 units  11/30/20 21:00  12/01/20 07:55





  Heparin 5,000 Units/Ml Vial  SC   Not Given





  BID KAT  


 


Vancomycin HCl 1 gm/ Device  200 mls @ 200 mls/hr  12/01/20 01:00  12/01/20 

12:09





  IVPB   200 mls





  0100,1300 KAT   Administration


 


Piperacillin Sod/Tazobactam  100 mls @ 200 mls/hr  11/30/20 22:00  12/01/20 

14:17





  Sod 3.375 gm/ Sodium Chloride  IVPB   100 mls





  Q8HR KAT   Administration


 


Morphine Sulfate  4 mg  12/01/20 08:38  12/01/20 13:33





  Morphine 4 Mg/Ml Vial  SLOW IVP   4 mg





  Q4H PRN   Administration





  Severe Pain (7-10)  


 


Nicotine  14 mg  11/30/20 17:00  11/30/20 21:40





  Nicotine 14 Mg Patch  TD   Not Given





  Q24HR KAT  


 


Polyethylene Glycol  17 gm  12/01/20 09:00  12/01/20 07:54





  Polyethylene Glycol 3350 17 Gm Packet  PO   17 gm





  DAILY KAT   Administration














- Exam


General Appearance: awake alert


Eye: anicteric sclera


ENT: moist mucosa


Neck: supple


Heart: RRR


Respiratory: CTAB


Gastrointestinal: soft, non-tender


Skin: no rashes


Psychiatric: normal affect, normal behavior





Hosp A/P





- Plan





- Problem





(1) Malignant neoplasm of face


Code(s): C76.0 - MALIGNANT NEOPLASM OF HEAD, FACE AND NECK   Status: Acute   


Assessment and Plan: 


Discussed with ENT service.  Patient will need maxillofacial surgery with flap 

reconstruction, which cannot be done here.  He had a biopsy at [facility] and is 

physicians are at [facility].  ENT service recommends that patient follow-up at 

[facility] either through hospital to hospital transfer or by patient presenting 

at [facility].  Will initiate transfer.





(2) Anemia


Code(s): D64.9 - ANEMIA, UNSPECIFIED   Status: Acute   


Assessment and Plan: 


Hemoglobin dropped today, recheck.








(3) Leucocytosis


Code(s): D72.829 - ELEVATED WHITE BLOOD CELL COUNT, UNSPECIFIED   Status: Acute 

 


Qualifiers: 


   Hypereosinophilic syndrome type: idiopathic 








(4) Cellulitis diffuse, face


Code(s): L03.211 - CELLULITIS OF FACE   Status: Acute   


Assessment and Plan: 


Patient is currently on vancomycin and Zosyn, continue for now.  Transition to 

oral antibiotics when possible.





(5) Tobacco abuse


Code(s): Z72.0 - TOBACCO USE   Status: Chronic   


Assessment and Plan: 


Has been counseled. 








(6) Homeless


Code(s): Z59.0 - HOMELESSNESS   Status: Acute   


Assessment and Plan: 


SW has been consulted.








(7) Dysphagia


Code(s): R13.10 - DYSPHAGIA, UNSPECIFIED   Status: Acute   


Assessment and Plan: 


Appreciate speech therapy input.

## 2020-12-02 LAB
ANION GAP SERPL CALC-SCNC: 10 MMOL/L (ref 10–20)
BASOPHILS # BLD AUTO: 0.1 THOU/UL (ref 0–0.2)
BASOPHILS NFR BLD AUTO: 0.6 % (ref 0–1)
BUN SERPL-MCNC: 14 MG/DL (ref 8.4–25.7)
CALCIUM SERPL-MCNC: 8.5 MG/DL (ref 7.8–10.44)
CHLORIDE SERPL-SCNC: 105 MMOL/L (ref 98–107)
CO2 SERPL-SCNC: 25 MMOL/L (ref 23–31)
CREAT CL PREDICTED SERPL C-G-VRATE: 82 ML/MIN (ref 70–130)
EOSINOPHIL # BLD AUTO: 0.9 THOU/UL (ref 0–0.7)
EOSINOPHIL NFR BLD AUTO: 8.4 % (ref 0–10)
GLUCOSE SERPL-MCNC: 104 MG/DL (ref 80–115)
HGB BLD-MCNC: 8.3 G/DL (ref 14–18)
LYMPHOCYTES # BLD: 2.2 THOU/UL (ref 1.2–3.4)
LYMPHOCYTES NFR BLD AUTO: 20.5 % (ref 21–51)
MCH RBC QN AUTO: 27.1 PG (ref 27–31)
MCV RBC AUTO: 87.2 FL (ref 78–98)
MONOCYTES # BLD AUTO: 0.9 THOU/UL (ref 0.11–0.59)
MONOCYTES NFR BLD AUTO: 8.2 % (ref 0–10)
NEUTROPHILS # BLD AUTO: 6.7 THOU/UL (ref 1.4–6.5)
NEUTROPHILS NFR BLD AUTO: 62.3 % (ref 42–75)
PLATELET # BLD AUTO: 622 THOU/UL (ref 130–400)
POTASSIUM SERPL-SCNC: 4.4 MMOL/L (ref 3.5–5.1)
RBC # BLD AUTO: 3.06 MILL/UL (ref 4.7–6.1)
SODIUM SERPL-SCNC: 136 MMOL/L (ref 136–145)
VANCOMYCIN TROUGH SERPL-MCNC: 15.3 UG/ML
WBC # BLD AUTO: 10.7 THOU/UL (ref 4.8–10.8)

## 2020-12-02 RX ADMIN — VANCOMYCIN HYDROCHLORIDE SCH MLS: 1 INJECTION, SOLUTION INTRAVENOUS at 12:23

## 2020-12-02 RX ADMIN — ALUMINUM HYDROXIDE AND MAGNESIUM HYDROXIDE PRN ML: 200; 200 SUSPENSION ORAL at 19:17

## 2020-12-02 RX ADMIN — HEPARIN SODIUM SCH: 5000 INJECTION, SOLUTION INTRAVENOUS; SUBCUTANEOUS at 09:35

## 2020-12-02 RX ADMIN — ALUMINUM HYDROXIDE AND MAGNESIUM HYDROXIDE PRN ML: 200; 200 SUSPENSION ORAL at 12:21

## 2020-12-02 RX ADMIN — HEPARIN SODIUM SCH: 5000 INJECTION, SOLUTION INTRAVENOUS; SUBCUTANEOUS at 21:05

## 2020-12-02 RX ADMIN — VANCOMYCIN HYDROCHLORIDE SCH MLS: 1 INJECTION, SOLUTION INTRAVENOUS at 00:45

## 2020-12-02 NOTE — PDOC.HOSPP
- Subjective


Encounter Date: 12/02/20


Encounter Time: 11:00


Subjective: 


Patient seen for follow-up regarding facial tumor.  He denies chest pain or 

shortness of breath.





- Objective


Vital Signs & Weight: 


                             Vital Signs (12 hours)











  Temp Pulse Resp BP Pulse Ox


 


 12/02/20 13:00      94 L


 


 12/02/20 08:00  97.9 F  78  16  151/72 H  91 L








                                     Weight











Admit Weight                   167 lb


 


Weight                         167 lb














I&O: 


                                        











 12/01/20 12/02/20 12/03/20





 06:59 06:59 06:59


 


Intake Total  2110 


 


Balance  2110 











Result Diagrams: 


                                 12/02/20 07:43





                                 12/02/20 07:43


Additional Labs: 


Labs and MAR reviewed by me





Hospitalist ROS





- Review of Systems


Cardiovascular: denies: chest pain, palpitations, orthopnea, paroxysmal noc. 

dyspnea, edema, light headedness


Gastrointestinal: denies: nausea, vomiting, abdominal pain, diarrhea, 

constipation, melena, hematochezia


Musculoskeletal: reports: other (Facial pain)





- Medication


Medications: 


Active Medications











Generic Name Dose Route Start Last Admin





  Trade Name Freq  PRN Reason Stop Dose Admin


 


Hydrocodone Bitart/Acetaminophen  1 tab  11/30/20 16:33  12/01/20 07:52





  Hydrocodone/Acetaminophen 5/325 Mg Tablet  PO   1 tab





  Q4H PRN   Administration





  Moderate Pain (4-6)  


 


Bisacodyl  10 mg  11/30/20 16:36  12/02/20 09:13





  Bisacodyl 10 Mg Supp  IN   10 mg





  DAILYPRN PRN   Administration





  Constipation  


 


Al Hydroxide/Mg Hydroxide 60  0 ml  12/01/20 09:02  12/02/20 12:21





ml/ Lidocaine HCl 30 ml/  SSW   10 ml





Diphenhydramine HCl 75 mg  Q6H PRN   Administration





  Mouth Irritation  


 


Famotidine  20 mg  11/30/20 21:00  12/02/20 09:06





  Famotidine 20 Mg Tab  PO   20 mg





  BID KAT   Administration


 


Heparin Sodium (Porcine)  5,000 units  11/30/20 21:00  12/02/20 09:35





  Heparin 5,000 Units/Ml Vial  SC   Not Given





  BID KAT  


 


Vancomycin HCl 1 gm/ Device  200 mls @ 200 mls/hr  12/01/20 01:00  12/02/20 

12:23





  IVPB   200 mls





  0100,1300 KAT   Administration


 


Piperacillin Sod/Tazobactam  100 mls @ 200 mls/hr  11/30/20 22:00  12/02/20 

14:05





  Sod 3.375 gm/ Sodium Chloride  IVPB   100 mls





  Q8HR KAT   Administration


 


Morphine Sulfate  2 mg  12/01/20 18:11  12/02/20 15:03





  Morphine 2 Mg/Ml Vial  SLOW IVP   2 mg





  Q6H PRN   Administration





  Severe Pain (7-10)  


 


Nicotine  14 mg  11/30/20 17:00  12/02/20 18:00





  Nicotine 14 Mg Patch  TD   Not Given





  Q24HR KAT  


 


Polyethylene Glycol  17 gm  12/01/20 09:00  12/02/20 09:06





  Polyethylene Glycol 3350 17 Gm Packet  PO   Not Given





  DAILY KAT  


 


Tramadol HCl  50 mg  12/01/20 18:11  12/02/20 12:30





  Tramadol Hcl 50 Mg Tab  PO   50 mg





  Q6H PRN   Administration





  Moderate Pain (4-6)  














- Exam


General Appearance: awake alert


Eye: anicteric sclera


ENT: moist mucosa


Neck: supple


Heart: RRR


Respiratory: CTAB


Gastrointestinal: soft, non-tender


Skin: no rashes


Psychiatric: normal affect





Hosp A/P





- Plan





- Problem





(1) Malignant neoplasm of face


Code(s): C76.0 - MALIGNANT NEOPLASM OF HEAD, FACE AND NECK   Status: Acute   


Assessment and Plan: 


I discussed his case with physician in [place].  Patient will need work-up as 

outpatient through the hospital.  The physician at [place] recommended [facility] 

letter since she felt that patient would likely not survive if not treated 

within 6 months.





(2) Anemia


Code(s): D64.9 - ANEMIA, UNSPECIFIED   Status: Acute   


Assessment and Plan: 


Hemoglobin stable.








(3) Leucocytosis


Code(s): D72.829 - ELEVATED WHITE BLOOD CELL COUNT, UNSPECIFIED   Status: 

Resolved.   


Qualifiers: 


   Hypereosinophilic syndrome type: idiopathic 








(4) Cellulitis diffuse, face


Code(s): L03.211 - CELLULITIS OF FACE   Status: Acute   


Assessment and Plan: 


Discontinue IV antibiotics, start clindamycin p.o.





(5) Tobacco abuse


Code(s): Z72.0 - TOBACCO USE   Status: Chronic   


Assessment and Plan: 


Has been counseled. 








(6) Homeless


Code(s): Z59.0 - HOMELESSNESS   Status: Acute   


Assessment and Plan: 


SW has been consulted.








(7) Dysphagia


Code(s): R13.10 - DYSPHAGIA, UNSPECIFIED   Status: Acute   


Assessment and Plan: 


Appreciate speech therapy input.

## 2020-12-02 NOTE — CON
DATE OF CONSULTATION:  



CHIEF COMPLAINT:  Left facial and left eye mass.



HISTORY OF PRESENT ILLNESS:  The patient is a 65-year-old male patient 
presenting to

the hospital with a large erosive facial mass, which is obstructing the left 
eye.

The patient has had this mass biopsied in [place] and the patient states that 
this

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  He notes that this 
mass

has been evaluated and treated over period of several months.  However, he said 
that

he is unaware of the final procedure or treatment which needs to be undertaken. 
He

has been a patient who has been followed by Dr. White, who is a local ENT

physician.  He has noted that the facial mass has become larger.  It is 
obstructing

his vision causing difficulty when eating and presented to the emergency room 
where he

noted an opening of the facial mass communicating from the mass into the skin. 
An

ENT consultation was made for evaluation of this mass with help with disposition
and treatment planning. 



PAST MEDICAL HISTORY:  COPD, constipation.



PAST SURGICAL HISTORY:  Biopsies of the facial mass; surgery for facial mass,

unknown exactly what type of surgery. 



FAMILY HISTORY:  Noncontributory.



SOCIAL HISTORY:  Current smoker, previous drug use, and homelessness.



REVIEW OF SYSTEMS:  A 12-point review of systems was completed and negative 
except

as otherwise listed in the HPI. 



PHYSICAL EXAMINATION:

GENERAL:  No acute distress.  Alert and oriented. 

HEAD AND FACE:  Atraumatic; however, large left facial mass, which is centered 
over

the left maxilla extending up and obstructing the left eye and extending down to

the left cheek with overlying erythema and mild serous drainage.  There is no 
significant tenderness of the sinuses around this mass.

 However, the mass is mildly tender to palpation. 

EYES:  Left eye is obstructed by the mass.  Right eye; extraocular movements are

intact, and pupils are equally round and reactive to light without nystagmus. 

EARS:  Right and left pinna are normal.  EAC is clear. 

NOSE:  External nose is normal with slight mass effect from the left maxillary 
mass.

 There is no bleeding from the mass or from the nasal cavity. 

ORAL CAVITY:  it is noted that the left mass is eroding into the hard and soft 
palate.

 However, there is no edema, erythema, or purulence noted. 

NECK:  No significant lymphadenopathy.  Trachea is midline.  Thyroid is normal 
to

size without apparent nodules. 

NEUROLOGIC:  Cranial nerves 2 through 12 are grossly intact except for the left 
eye. 

PSYCHIATRIC:  Mood and affect are normal.



DATA:  No current imaging.



ASSESSMENT AND PLAN:  A 65-year-old male patient with a very large left facial 
mass

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
in

[place].  Given the size, location, and presentation of the left facial mass 
and

given the pathologic diagnosis as given by the patient, this mass would require 
a

large craniofacial resection including palatectomy, maxillectomy, and 
potentially

enucleation of the eye and resection of orbital bone.  This type of surgery is 
extensive and would only

be able to be performed at a tertiary referral medical center and will require a

composite free flap reconstruction by craniofacial plastics and reconstructive

surgical team.  Given that the patient has had a biopsy and had surgery noting 
this

mass already at an outside hospital, I would not recommend a repeat biopsy at 
this

time given a pathologic diagnosis has already been made and a repeat biopsy 
would

likely only cause further bleeding or potential superinfection of the mass.  The

patient is currently being treated with antibiotics which I agree with until 
definitive surgery can be performed and given the size and location of

the mass, I think that this is appropriate for a full 10 - 14 days course.  I 
would recommend that the patient be

transferred or referred to a tertiary medical center for complete treatment of 
this

mass given the extent as soon as the patient is medically cleared by

his primary team. 



Thank you for this consultation.  Please call [phone] with further 
questions.







Job ID:  [id number]



[facility]

## 2020-12-03 VITALS — DIASTOLIC BLOOD PRESSURE: 79 MMHG | TEMPERATURE: 98 F | SYSTOLIC BLOOD PRESSURE: 138 MMHG

## 2020-12-03 LAB
ANION GAP SERPL CALC-SCNC: 11 MMOL/L (ref 10–20)
BASOPHILS # BLD AUTO: 0.1 THOU/UL (ref 0–0.2)
BASOPHILS NFR BLD AUTO: 0.8 % (ref 0–1)
BUN SERPL-MCNC: 15 MG/DL (ref 8.4–25.7)
CALCIUM SERPL-MCNC: 8.7 MG/DL (ref 7.8–10.44)
CHLORIDE SERPL-SCNC: 103 MMOL/L (ref 98–107)
CO2 SERPL-SCNC: 26 MMOL/L (ref 23–31)
CREAT CL PREDICTED SERPL C-G-VRATE: 95 ML/MIN (ref 70–130)
EOSINOPHIL # BLD AUTO: 0.9 THOU/UL (ref 0–0.7)
EOSINOPHIL NFR BLD AUTO: 7.4 % (ref 0–10)
GLUCOSE SERPL-MCNC: 117 MG/DL (ref 80–115)
HGB BLD-MCNC: 8.5 G/DL (ref 14–18)
LYMPHOCYTES # BLD: 2.3 THOU/UL (ref 1.2–3.4)
LYMPHOCYTES NFR BLD AUTO: 19.5 % (ref 21–51)
MCH RBC QN AUTO: 27.2 PG (ref 27–31)
MCV RBC AUTO: 88.4 FL (ref 78–98)
MONOCYTES # BLD AUTO: 0.9 THOU/UL (ref 0.11–0.59)
MONOCYTES NFR BLD AUTO: 7.4 % (ref 0–10)
NEUTROPHILS # BLD AUTO: 7.6 THOU/UL (ref 1.4–6.5)
NEUTROPHILS NFR BLD AUTO: 64.9 % (ref 42–75)
PLATELET # BLD AUTO: 609 THOU/UL (ref 130–400)
POTASSIUM SERPL-SCNC: 4 MMOL/L (ref 3.5–5.1)
RBC # BLD AUTO: 3.13 MILL/UL (ref 4.7–6.1)
SODIUM SERPL-SCNC: 136 MMOL/L (ref 136–145)
WBC # BLD AUTO: 11.7 THOU/UL (ref 4.8–10.8)

## 2020-12-03 RX ADMIN — HEPARIN SODIUM SCH: 5000 INJECTION, SOLUTION INTRAVENOUS; SUBCUTANEOUS at 09:18

## 2020-12-03 NOTE — PDOC.DS.DS
Provider





- Provider


Date of Admission: 


11/30/20 14:05





Date of Discharge: 12/03/20


Admitting Provider: 


Michelle Baumann MD





Consultations: ENT (Dr. Sparks)


Primary Care Physician: 


NO PCP PROVIDER








Course





- Hospital Course


Hospital Course: 


Discharge diagnosis:


1.  Facial tumor


2.  COVID-19 PCR test negative


3.  Cellulitis





Hospital course:


Patient is a pleasant 65-year-old gentleman who was admitted to the hospital on 

November 30, 2020 for left-sided facial tumor.  He was seen by ENT service.  He 

received antibiotics for cellulitis of the tumor.  He also received pain 

medications.  ENT service felt that he needed to be followed up at [facility], 

where his care was and he needed maxillofacial surgery with flap reconstruction,

which is not possible at our facility.  I attempted to transfer him to [facility]

as a direct admission.  The physician at [facility] notified me that patient 

needs outpatient work-up and has to follow-up with him for imaging etc. as 

outpatient.  He is being discharged home in a stable condition.





Many thanks for allowing me to participate in your patient's care.





Discharge destination: Home





Total amount of time spent coordinating this discharge: 31 minutes.


Resuscitation Status: 








11/30/20 16:33


Resuscitation Status Routine 


   Resuscitation Status: FULL: Full Resuscitation


   Discussed with: patient











- Labs


Lab Results: 


                                        





                                 12/03/20 05:12 





                                 12/03/20 05:11 





Abnormal Lab Results - Last 48 hrs





12/02/20 07:43: RBC 3.06 L, Hgb 8.3 L, Hct 26.7 L, MCHC 31.1 L, RDW 14.6 H, Plt 

Count 622 H, MPV 6.0 L, Lymphocytes % 20.5 L, Neutrophils # 6.7 H, Monocytes # 

0.9 H, Eosinophils # 0.9 H


12/03/20 05:12: WBC 11.7 H, RBC 3.13 L, Hgb 8.5 L, Hct 27.7 L, MCHC 30.8 L, Plt 

Count 609 H, MPV 5.8 L, Lymphocytes % 19.5 L, Neutrophils # 7.6 H, Monocytes # 

0.9 H, Eosinophils # 0.9 H





Microbiology - Entire Visit





11/30/20 11:38   Venous blood - Right Arm   Blood Culture - Preliminary


                            NO GROWTH AT 48 HOURS


11/30/20 11:38   Venous blood - Right Arm   Blood Culture - Preliminary


                            NO GROWTH AT 48 HOURS











- Physical Exam


Vitals: 


                             Vital Signs (12 hours)











  Temp Pulse Resp BP Pulse Ox


 


 12/03/20 09:00      97


 


 12/03/20 08:00  97.9 F  65  20  135/76  97








                                     Weight











Admit Weight                   167 lb


 


Weight                         167 lb














Physical Exam: 


The patient was seen and examined on the day of discharge.  Patient denies chest

pain or shortness of breath.  Vital signs are stable.  S1 and S2 are heard.  

Lungs are clear to auscultation bilaterally.








Plan





- Discharge Medications


Prescriptions: 


Clindamycin HCl 300 mg PO TID #30 capsule


Home Medications: 


                                        











 Medication  Instructions  Recorded  Confirmed  Type


 


Naproxen Sodium [Aleve] 220 mg PO BID PRN 09/02/20 12/01/20 History


 


Aspirin [Ecotrin Low Strength] 81 mg PO DAILY 12/01/20 12/01/20 History


 


Clindamycin HCl 300 mg PO TID #30 capsule 12/03/20  Rx


 


Nicotine [Nicoderm CQ] 14 mg TD Q24HR  patch 12/03/20  Rx


 


traMADol HCl [Ultram] 50 mg PO Q6H PRN  tab 12/03/20  Rx











Allergies: 








No Known Allergies Allergy (Verified 09/02/20 05:12)


   








- Discharge Instructions


Discharge Instructions:: 


Follow-up with [facility] for management of facial tumor.  Hope with primary care

 provider for final blood culture results.


Activity:: Activity as Tolerated


Nourishment:: Heart Healthy Diet





- Follow up Plan


Referrals: 


PROVIDER,NO PCP [Primary Care Provider] - 


Disposition: HOME





Quality





- Care Measures


CORE MEASURES:: N/A

## 2020-12-04 NOTE — PQF
CLINICAL DOCUMENTATION CLARIFICATION FORM:

Dear DrRaffaele: Nilton Johnson          Date / Time: 12/4/2020

Please exercise your independent, professional judgment in responding to the 
clarification form. 

Clinical indicators are provided on the bottom of this form for your review



In your clinical opinion based on clinical findings below, can you please 
further specify Facial tumor if:



Please check appropriate box(es):

[ x ] Inverted Facial Papilloma (Benign Neoplasm)

[  ] Malignant neoplasm of Face

[  ] Other diagnosis, please specify ___________

[  ] Unable to determine



Physician Signature:                Date/Time:



For continuity of documentation, please document condition throughout progress 
notes and discharge summary.  Thank You.

To be completed by CDI/Coding staff for physician review: 







 Present Clinical Indicators - Signs / Symptoms / Labs Results and Location in 

Medical Record

 

[X] Presented with worsening facial pain H&P p1 11/30 Dr Martinez

 

[X] Pt was diagnosed with an inverted facial papilloma in March 2020 H&P p1 
11/30 Dr Martinez

 

[X] Anemia likely due to Malignancy H&P p3 11/30 Dr Martinez

 

[X] Malignant neoplasm of face H&P p4 11/30 Dr Martinez

 

Present Risk Factors                                                            
              Results and Location in Medical Record

 

[X] 65 year-old Male H&P p1 11/30 Dr Martinez

 

[X] Smoker H&P p1 11/30 Dr Martinez

 

[X] Cellulitis of Face H&P p4 11/30 Dr Martinez

 

Present Treatments                                                              
                Results and Location in Medical Record

 

[X] ENT consult Consult Dr Sparks 12/2

 

[X] IV Vancomycin 1.75 gm MAR 11/30

 

[X] IV Zosyn 3.375gm MAR 11/30

 

[X] IV Morphine 4 mg MAR 11/30





CDS/ Signature: Ida Aguirrepratima      Phone #: [phone]    Date/Time: 12/04/2020

                 This is a permanent part of the Medical Record

[facility]

## 2020-12-08 ENCOUNTER — HOSPITAL ENCOUNTER (EMERGENCY)
Dept: HOSPITAL 92 - ERS | Age: 65
Discharge: HOME | End: 2020-12-08
Payer: SELF-PAY

## 2020-12-08 DIAGNOSIS — R55: Primary | ICD-10-CM

## 2020-12-08 DIAGNOSIS — J44.9: ICD-10-CM

## 2020-12-08 LAB
ALBUMIN SERPL BCG-MCNC: 2.9 G/DL (ref 3.4–4.8)
ALP SERPL-CCNC: 82 U/L (ref 40–110)
ALT SERPL W P-5'-P-CCNC: 49 U/L (ref 8–55)
ANION GAP SERPL CALC-SCNC: 12 MMOL/L (ref 10–20)
AST SERPL-CCNC: 56 U/L (ref 5–34)
BASOPHILS # BLD AUTO: 0.1 THOU/UL (ref 0–0.2)
BASOPHILS NFR BLD AUTO: 0.7 % (ref 0–1)
BILIRUB SERPL-MCNC: (no result) MG/DL (ref 0.2–1.2)
BUN SERPL-MCNC: 20 MG/DL (ref 8.4–25.7)
CALCIUM SERPL-MCNC: 8.1 MG/DL (ref 7.8–10.44)
CHLORIDE SERPL-SCNC: 107 MMOL/L (ref 98–107)
CO2 SERPL-SCNC: 22 MMOL/L (ref 23–31)
CREAT CL PREDICTED SERPL C-G-VRATE: 0 ML/MIN (ref 70–130)
EOSINOPHIL # BLD AUTO: 0.5 THOU/UL (ref 0–0.7)
EOSINOPHIL NFR BLD AUTO: 4.7 % (ref 0–10)
GLOBULIN SER CALC-MCNC: 2.9 G/DL (ref 2.4–3.5)
GLUCOSE SERPL-MCNC: 150 MG/DL (ref 80–115)
HGB BLD-MCNC: 8.3 G/DL (ref 14–18)
LYMPHOCYTES # BLD: 1.6 THOU/UL (ref 1.2–3.4)
LYMPHOCYTES NFR BLD AUTO: 13.4 % (ref 21–51)
MCH RBC QN AUTO: 26.4 PG (ref 27–31)
MCV RBC AUTO: 88 FL (ref 78–98)
MDIFF COMPLETE?: YES
MONOCYTES # BLD AUTO: 0.7 THOU/UL (ref 0.11–0.59)
MONOCYTES NFR BLD AUTO: 6.2 % (ref 0–10)
NEUTROPHILS # BLD AUTO: 8.7 THOU/UL (ref 1.4–6.5)
NEUTROPHILS NFR BLD AUTO: 75 % (ref 42–75)
PLATELET # BLD AUTO: 654 THOU/UL (ref 130–400)
POLYCHROMASIA BLD QL SMEAR: (no result) (100X)
POTASSIUM SERPL-SCNC: 4.4 MMOL/L (ref 3.5–5.1)
RBC # BLD AUTO: 3.16 MILL/UL (ref 4.7–6.1)
SODIUM SERPL-SCNC: 137 MMOL/L (ref 136–145)
WBC # BLD AUTO: 11.6 THOU/UL (ref 4.8–10.8)

## 2020-12-08 PROCEDURE — 93005 ELECTROCARDIOGRAM TRACING: CPT

## 2020-12-08 PROCEDURE — 80053 COMPREHEN METABOLIC PANEL: CPT

## 2020-12-08 PROCEDURE — 84484 ASSAY OF TROPONIN QUANT: CPT

## 2020-12-08 PROCEDURE — 82550 ASSAY OF CK (CPK): CPT

## 2020-12-08 PROCEDURE — 71045 X-RAY EXAM CHEST 1 VIEW: CPT

## 2020-12-08 PROCEDURE — 71275 CT ANGIOGRAPHY CHEST: CPT

## 2020-12-08 PROCEDURE — 85025 COMPLETE CBC W/AUTO DIFF WBC: CPT

## 2020-12-08 PROCEDURE — 36415 COLL VENOUS BLD VENIPUNCTURE: CPT

## 2020-12-08 NOTE — RAD
PORTABLE CHEST:

 

Date:  12/08/2020

 

HISTORY:  

Syncope. 

 

COMPARISON:  

11/14/2020 exam. 

 

FINDINGS:

Heart size and mediastinum within normal limits. There is some chronic-appearing parenchymal change i
n the left base. I do not appreciate the significant change since the prior exam. Lesser changes are 
seen within the right base. 

 

IMPRESSION: 

Chronic appearing lung change. 

 

 

POS: [facility]

## 2020-12-08 NOTE — CT
CT ANGIO OF CHEST PERFORMED WITH IV CONTRAST ENHANCEMENT WITH 3D RECONSTRUCTIONS:

 

Date:  12/08/2020

 

HISTORY:  

Syncopal episode. 

 

FINDINGS:

There are severe emphysematous lung changes. No focal infiltrative process is noted. 

 

No significant mediastinal or hilar adenopathy. Minimal coronary calcification is seen.

 

Good pulmonary artery opacification and no CT evidence for pulmonary embolus. 

 

Visualized liver parenchyma shows no focal findings other than a small hypodensity in the caudate and
 right and left lobes. These are all stable. 

 

IMPRESSION: 

1.  Severe COPD changes with prominent emphysematous changes and bullous changes in the upper lobes. 


 

2.  No CT evidence for pulmonary embolus. 

 

 

POS: [facility]

## 2020-12-11 ENCOUNTER — HOSPITAL ENCOUNTER (EMERGENCY)
Dept: HOSPITAL 92 - ERS | Age: 65
Discharge: HOME | End: 2020-12-11
Payer: SELF-PAY

## 2020-12-11 DIAGNOSIS — D49.89: ICD-10-CM

## 2020-12-11 DIAGNOSIS — J44.9: ICD-10-CM

## 2020-12-11 DIAGNOSIS — Z79.82: ICD-10-CM

## 2020-12-11 DIAGNOSIS — F17.290: ICD-10-CM

## 2020-12-11 DIAGNOSIS — R55: Primary | ICD-10-CM

## 2020-12-11 DIAGNOSIS — F17.210: ICD-10-CM

## 2020-12-11 LAB
ALBUMIN SERPL BCG-MCNC: 3.1 G/DL (ref 3.4–4.8)
ALP SERPL-CCNC: 98 U/L (ref 40–110)
ALT SERPL W P-5'-P-CCNC: 23 U/L (ref 8–55)
ANION GAP SERPL CALC-SCNC: 10 MMOL/L (ref 10–20)
APAP SERPL-MCNC: 12 MCG/ML (ref 10–30)
AST SERPL-CCNC: 14 U/L (ref 5–34)
BASOPHILS # BLD AUTO: 0.1 THOU/UL (ref 0–0.2)
BASOPHILS NFR BLD AUTO: 0.7 % (ref 0–1)
BILIRUB SERPL-MCNC: (no result) MG/DL (ref 0.2–1.2)
BUN SERPL-MCNC: 14 MG/DL (ref 8.4–25.7)
CALCIUM SERPL-MCNC: 8.9 MG/DL (ref 7.8–10.44)
CHLORIDE SERPL-SCNC: 106 MMOL/L (ref 98–107)
CO2 SERPL-SCNC: 25 MMOL/L (ref 23–31)
CREAT CL PREDICTED SERPL C-G-VRATE: 0 ML/MIN (ref 70–130)
DRUG SCREEN CUTOFF: (no result)
EOSINOPHIL # BLD AUTO: 0.7 THOU/UL (ref 0–0.7)
EOSINOPHIL NFR BLD AUTO: 5.4 % (ref 0–10)
GLOBULIN SER CALC-MCNC: 3.1 G/DL (ref 2.4–3.5)
GLUCOSE SERPL-MCNC: 83 MG/DL (ref 80–115)
HGB BLD-MCNC: 8.2 G/DL (ref 14–18)
LYMPHOCYTES # BLD: 1.7 THOU/UL (ref 1.2–3.4)
LYMPHOCYTES NFR BLD AUTO: 14 % (ref 21–51)
MCH RBC QN AUTO: 26.8 PG (ref 27–31)
MCV RBC AUTO: 87.6 FL (ref 78–98)
MEDTOX CONTROL LINE VALID?: (no result)
MEDTOX READER #: (no result)
MONOCYTES # BLD AUTO: 1.1 THOU/UL (ref 0.11–0.59)
MONOCYTES NFR BLD AUTO: 8.7 % (ref 0–10)
NEUTROPHILS # BLD AUTO: 8.8 THOU/UL (ref 1.4–6.5)
NEUTROPHILS NFR BLD AUTO: 71.2 % (ref 42–75)
PLATELET # BLD AUTO: 610 THOU/UL (ref 130–400)
POTASSIUM SERPL-SCNC: 4.2 MMOL/L (ref 3.5–5.1)
RBC # BLD AUTO: 3.05 MILL/UL (ref 4.7–6.1)
SALICYLATES SERPL-MCNC: (no result) MG/DL (ref 15–30)
SODIUM SERPL-SCNC: 137 MMOL/L (ref 136–145)
SP GR UR STRIP: 1.02 (ref 1–1.04)
WBC # BLD AUTO: 12.4 THOU/UL (ref 4.8–10.8)

## 2020-12-11 PROCEDURE — 85025 COMPLETE CBC W/AUTO DIFF WBC: CPT

## 2020-12-11 PROCEDURE — 81003 URINALYSIS AUTO W/O SCOPE: CPT

## 2020-12-11 PROCEDURE — 85379 FIBRIN DEGRADATION QUANT: CPT

## 2020-12-11 PROCEDURE — 96374 THER/PROPH/DIAG INJ IV PUSH: CPT

## 2020-12-11 PROCEDURE — 36415 COLL VENOUS BLD VENIPUNCTURE: CPT

## 2020-12-11 PROCEDURE — 96375 TX/PRO/DX INJ NEW DRUG ADDON: CPT

## 2020-12-11 PROCEDURE — 80053 COMPREHEN METABOLIC PANEL: CPT

## 2020-12-11 PROCEDURE — 84484 ASSAY OF TROPONIN QUANT: CPT

## 2020-12-11 PROCEDURE — 84443 ASSAY THYROID STIM HORMONE: CPT

## 2020-12-11 PROCEDURE — 71045 X-RAY EXAM CHEST 1 VIEW: CPT

## 2020-12-11 PROCEDURE — 80307 DRUG TEST PRSMV CHEM ANLYZR: CPT

## 2020-12-11 PROCEDURE — 93005 ELECTROCARDIOGRAM TRACING: CPT

## 2020-12-11 PROCEDURE — 80306 DRUG TEST PRSMV INSTRMNT: CPT

## 2020-12-11 PROCEDURE — 87086 URINE CULTURE/COLONY COUNT: CPT

## 2020-12-11 NOTE — RAD
PORTABLE CHEST ONE VIEW:

12/11/20 at 2:26 p.m.

 

HISTORY: 

Syncope. 

 

COMPARISON: 

12/8/20.

 

FINDINGS: 

The heart size is normal. The lungs are well expanded without lobar consolidation, pneumothoraces, or
 pleural effusions. 

 

IMPRESSION: 

No acute process. 

 

POS: OFF

## 2020-12-14 ENCOUNTER — HOSPITAL ENCOUNTER (EMERGENCY)
Dept: HOSPITAL 92 - ERS | Age: 65
Discharge: HOME | End: 2020-12-14
Payer: SELF-PAY

## 2020-12-14 DIAGNOSIS — G89.29: Primary | ICD-10-CM

## 2020-12-14 DIAGNOSIS — F17.290: ICD-10-CM

## 2020-12-14 DIAGNOSIS — H57.12: ICD-10-CM

## 2020-12-14 DIAGNOSIS — F17.210: ICD-10-CM

## 2020-12-14 PROCEDURE — 99283 EMERGENCY DEPT VISIT LOW MDM: CPT

## 2020-12-19 ENCOUNTER — HOSPITAL ENCOUNTER (EMERGENCY)
Dept: HOSPITAL 92 - ERS | Age: 65
LOS: 1 days | Discharge: HOME | End: 2020-12-20
Payer: SELF-PAY

## 2020-12-19 DIAGNOSIS — C78.39: ICD-10-CM

## 2020-12-19 DIAGNOSIS — C79.2: Primary | ICD-10-CM

## 2020-12-19 DIAGNOSIS — J44.9: ICD-10-CM

## 2020-12-19 DIAGNOSIS — F17.210: ICD-10-CM

## 2020-12-19 DIAGNOSIS — Z79.899: ICD-10-CM

## 2020-12-19 LAB
ALBUMIN SERPL BCG-MCNC: 3.4 G/DL (ref 3.4–4.8)
ALP SERPL-CCNC: 102 U/L (ref 40–110)
ALT SERPL W P-5'-P-CCNC: 11 U/L (ref 8–55)
ANION GAP SERPL CALC-SCNC: 15 MMOL/L (ref 10–20)
AST SERPL-CCNC: 12 U/L (ref 5–34)
BASOPHILS # BLD AUTO: 0.1 THOU/UL (ref 0–0.2)
BASOPHILS NFR BLD AUTO: 0.7 % (ref 0–1)
BILIRUB SERPL-MCNC: (no result) MG/DL (ref 0.2–1.2)
BUN SERPL-MCNC: 15 MG/DL (ref 8.4–25.7)
CALCIUM SERPL-MCNC: 9 MG/DL (ref 7.8–10.44)
CHLORIDE SERPL-SCNC: 107 MMOL/L (ref 98–107)
CO2 SERPL-SCNC: 23 MMOL/L (ref 23–31)
CREAT CL PREDICTED SERPL C-G-VRATE: 0 ML/MIN (ref 70–130)
EOSINOPHIL # BLD AUTO: 0.7 THOU/UL (ref 0–0.7)
EOSINOPHIL NFR BLD AUTO: 5.8 % (ref 0–10)
GLOBULIN SER CALC-MCNC: 3.6 G/DL (ref 2.4–3.5)
GLUCOSE SERPL-MCNC: 107 MG/DL (ref 80–115)
HGB BLD-MCNC: 8.6 G/DL (ref 14–18)
LYMPHOCYTES # BLD: 1.7 THOU/UL (ref 1.2–3.4)
LYMPHOCYTES NFR BLD AUTO: 14 % (ref 21–51)
MCH RBC QN AUTO: 26.9 PG (ref 27–31)
MCV RBC AUTO: 85.4 FL (ref 78–98)
MONOCYTES # BLD AUTO: 1.2 THOU/UL (ref 0.11–0.59)
MONOCYTES NFR BLD AUTO: 9.6 % (ref 0–10)
NEUTROPHILS # BLD AUTO: 8.4 THOU/UL (ref 1.4–6.5)
NEUTROPHILS NFR BLD AUTO: 69.9 % (ref 42–75)
PLATELET # BLD AUTO: 848 THOU/UL (ref 130–400)
POTASSIUM SERPL-SCNC: 4.8 MMOL/L (ref 3.5–5.1)
RBC # BLD AUTO: 3.2 MILL/UL (ref 4.7–6.1)
SODIUM SERPL-SCNC: 140 MMOL/L (ref 136–145)
WBC # BLD AUTO: 12 THOU/UL (ref 4.8–10.8)

## 2020-12-19 PROCEDURE — 96375 TX/PRO/DX INJ NEW DRUG ADDON: CPT

## 2020-12-19 PROCEDURE — 80053 COMPREHEN METABOLIC PANEL: CPT

## 2020-12-19 PROCEDURE — 36415 COLL VENOUS BLD VENIPUNCTURE: CPT

## 2020-12-19 PROCEDURE — 85025 COMPLETE CBC W/AUTO DIFF WBC: CPT

## 2020-12-19 PROCEDURE — 96374 THER/PROPH/DIAG INJ IV PUSH: CPT

## 2020-12-20 ENCOUNTER — HOSPITAL ENCOUNTER (INPATIENT)
Dept: HOSPITAL 92 - ERS | Age: 65
LOS: 11 days | Discharge: HOME | DRG: 948 | End: 2020-12-31
Attending: HOSPITALIST | Admitting: STUDENT IN AN ORGANIZED HEALTH CARE EDUCATION/TRAINING PROGRAM
Payer: SELF-PAY

## 2020-12-20 VITALS — BODY MASS INDEX: 26 KG/M2

## 2020-12-20 DIAGNOSIS — Z79.82: ICD-10-CM

## 2020-12-20 DIAGNOSIS — Z79.899: ICD-10-CM

## 2020-12-20 DIAGNOSIS — C78.7: ICD-10-CM

## 2020-12-20 DIAGNOSIS — Z66: ICD-10-CM

## 2020-12-20 DIAGNOSIS — F17.210: ICD-10-CM

## 2020-12-20 DIAGNOSIS — D64.9: ICD-10-CM

## 2020-12-20 DIAGNOSIS — K59.00: ICD-10-CM

## 2020-12-20 DIAGNOSIS — Z20.828: ICD-10-CM

## 2020-12-20 DIAGNOSIS — G89.3: Primary | ICD-10-CM

## 2020-12-20 DIAGNOSIS — R62.7: ICD-10-CM

## 2020-12-20 DIAGNOSIS — F32.9: ICD-10-CM

## 2020-12-20 DIAGNOSIS — J44.9: ICD-10-CM

## 2020-12-20 DIAGNOSIS — C76.0: ICD-10-CM

## 2020-12-20 LAB
ALBUMIN SERPL BCG-MCNC: 3.5 G/DL (ref 3.4–4.8)
ALP SERPL-CCNC: 93 U/L (ref 40–110)
ALT SERPL W P-5'-P-CCNC: 12 U/L (ref 8–55)
ANION GAP SERPL CALC-SCNC: 13 MMOL/L (ref 10–20)
AST SERPL-CCNC: 11 U/L (ref 5–34)
BASOPHILS # BLD AUTO: 0.1 THOU/UL (ref 0–0.2)
BASOPHILS NFR BLD AUTO: 0.4 % (ref 0–1)
BILIRUB SERPL-MCNC: 0.2 MG/DL (ref 0.2–1.2)
BUN SERPL-MCNC: 17 MG/DL (ref 8.4–25.7)
CALCIUM SERPL-MCNC: 9.3 MG/DL (ref 7.8–10.44)
CHLORIDE SERPL-SCNC: 106 MMOL/L (ref 98–107)
CO2 SERPL-SCNC: 24 MMOL/L (ref 23–31)
CREAT CL PREDICTED SERPL C-G-VRATE: 0 ML/MIN (ref 70–130)
EOSINOPHIL # BLD AUTO: 0.6 THOU/UL (ref 0–0.7)
EOSINOPHIL NFR BLD AUTO: 4.3 % (ref 0–10)
GLOBULIN SER CALC-MCNC: 3.6 G/DL (ref 2.4–3.5)
GLUCOSE SERPL-MCNC: 96 MG/DL (ref 80–115)
HGB BLD-MCNC: 8.5 G/DL (ref 14–18)
LIPASE SERPL-CCNC: 24 U/L (ref 8–78)
LYMPHOCYTES # BLD: 1.8 THOU/UL (ref 1.2–3.4)
LYMPHOCYTES NFR BLD AUTO: 12.8 % (ref 21–51)
MCH RBC QN AUTO: 26.5 PG (ref 27–31)
MCV RBC AUTO: 85.5 FL (ref 78–98)
MONOCYTES # BLD AUTO: 1.2 THOU/UL (ref 0.11–0.59)
MONOCYTES NFR BLD AUTO: 8.5 % (ref 0–10)
NEUTROPHILS # BLD AUTO: 10.2 THOU/UL (ref 1.4–6.5)
NEUTROPHILS NFR BLD AUTO: 74 % (ref 42–75)
PLATELET # BLD AUTO: 803 THOU/UL (ref 130–400)
POTASSIUM SERPL-SCNC: 5 MMOL/L (ref 3.5–5.1)
RBC # BLD AUTO: 3.21 MILL/UL (ref 4.7–6.1)
SODIUM SERPL-SCNC: 138 MMOL/L (ref 136–145)
WBC # BLD AUTO: 13.8 THOU/UL (ref 4.8–10.8)

## 2020-12-20 PROCEDURE — 36415 COLL VENOUS BLD VENIPUNCTURE: CPT

## 2020-12-20 PROCEDURE — 96376 TX/PRO/DX INJ SAME DRUG ADON: CPT

## 2020-12-20 PROCEDURE — 96374 THER/PROPH/DIAG INJ IV PUSH: CPT

## 2020-12-20 PROCEDURE — 83615 LACTATE (LD) (LDH) ENZYME: CPT

## 2020-12-20 PROCEDURE — 83690 ASSAY OF LIPASE: CPT

## 2020-12-20 PROCEDURE — 80076 HEPATIC FUNCTION PANEL: CPT

## 2020-12-20 PROCEDURE — 80048 BASIC METABOLIC PNL TOTAL CA: CPT

## 2020-12-20 PROCEDURE — 74177 CT ABD & PELVIS W/CONTRAST: CPT

## 2020-12-20 PROCEDURE — 80053 COMPREHEN METABOLIC PANEL: CPT

## 2020-12-20 PROCEDURE — 85025 COMPLETE CBC W/AUTO DIFF WBC: CPT

## 2020-12-20 PROCEDURE — 82150 ASSAY OF AMYLASE: CPT

## 2020-12-20 NOTE — CT
CT ABDOMEN AND PELVIS WITH IV CONTRAST

 12/20/2020



CLINICAL INFORMATION:

Left lower quadrant abdominal pain.



COMPARISON:

 Nonenhanced CT abdomen and pelvis on 11/30/2020



Technique:

Multiple contiguous axial CT images are obtained through the abdomen and pelvis with IV contrast. Cor
onal reformatted images are provided.



FINDINGS:



Lower Chest: Dependent bibasilar atelectasis is present.

Vessels: Vascular calcifications are seen in the abdominal aorta. Incidental note is made of a retroa
ortic left renal vein. 



Abdomen:

Portal vein:Patent

Gallbladder: Within normal limits for CT imaging.

Liver: Scattered subcentimeter too small to characterize hypodense lesions are again seen throughout 
each lobe of the liver. Findings could be related to multiple hepatic cysts, but cystic metastatic

disease would be difficult to entirely exclude.

Spleen: within normal limits.

Pancreas: within normal limits.

Adrenals: within normal limits.

Kidneys: Subcentimeter too small to characterize hypodense lesion superior pole left kidney. Kidneys 
otherwise demonstrate a normal CT appearance bilaterally.



Bowel: Small to moderate amount retained fecal material is seen throughout the colon. Loops of small 
bowel are normal in caliber.

Appendix: The appendix is visualized and normal in caliber.





Peritoneum: No ascites or free air; no fluid collection.

Mesentery and Retroperitoneum: No enlarged lymph nodes are seen by CT size criteria.

Abdominal Wall: Small fat-containing umbilical hernia again present.



Pelvis:

Reproductive Organs: No pelvic masses.

Bladder: within normal limits.



Bones: No suspicious lytic or sclerotic osseous lesions.  



IMPRESSION:



1. Scattered hypodense hepatic lesions difficult to characterize due to small size. Statistically, fi
ndings are likely due to multiple hepatic cysts. However, cystic metastatic disease would be

difficult to entirely exclude.

2. Small fat-containing umbilical hernia.

3. Constipation.

4. Vascular calcifications.

5. No acute findings in the abdomen or pelvis.



Reported By: Fredo Zelaya 

Electronically Signed:  12/20/2020 8:06 PM

## 2020-12-21 LAB
ANION GAP SERPL CALC-SCNC: 12 MMOL/L (ref 10–20)
BASOPHILS # BLD AUTO: 0.1 THOU/UL (ref 0–0.2)
BASOPHILS NFR BLD AUTO: 0.5 % (ref 0–1)
BUN SERPL-MCNC: 15 MG/DL (ref 8.4–25.7)
CALCIUM SERPL-MCNC: 9.2 MG/DL (ref 7.8–10.44)
CHLORIDE SERPL-SCNC: 103 MMOL/L (ref 98–107)
CO2 SERPL-SCNC: 25 MMOL/L (ref 23–31)
CREAT CL PREDICTED SERPL C-G-VRATE: 100 ML/MIN (ref 70–130)
EOSINOPHIL # BLD AUTO: 0.6 THOU/UL (ref 0–0.7)
EOSINOPHIL NFR BLD AUTO: 4.5 % (ref 0–10)
GLUCOSE SERPL-MCNC: 104 MG/DL (ref 80–115)
HGB BLD-MCNC: 8.1 G/DL (ref 14–18)
LYMPHOCYTES # BLD: 1.7 THOU/UL (ref 1.2–3.4)
LYMPHOCYTES NFR BLD AUTO: 11.9 % (ref 21–51)
MCH RBC QN AUTO: 26.1 PG (ref 27–31)
MCV RBC AUTO: 85.2 FL (ref 78–98)
MONOCYTES # BLD AUTO: 1.1 THOU/UL (ref 0.11–0.59)
MONOCYTES NFR BLD AUTO: 7.2 % (ref 0–10)
NEUTROPHILS # BLD AUTO: 11 THOU/UL (ref 1.4–6.5)
NEUTROPHILS NFR BLD AUTO: 75.9 % (ref 42–75)
PLATELET # BLD AUTO: 810 THOU/UL (ref 130–400)
POTASSIUM SERPL-SCNC: 4.5 MMOL/L (ref 3.5–5.1)
RBC # BLD AUTO: 3.09 MILL/UL (ref 4.7–6.1)
SODIUM SERPL-SCNC: 135 MMOL/L (ref 136–145)
WBC # BLD AUTO: 14.5 THOU/UL (ref 4.8–10.8)

## 2020-12-21 RX ADMIN — ONDANSETRON PRN MG: 2 INJECTION INTRAMUSCULAR; INTRAVENOUS at 05:31

## 2020-12-21 RX ADMIN — ACETAMINOPHEN AND CODEINE PHOSPHATE PRN TAB: 300; 30 TABLET ORAL at 17:40

## 2020-12-21 NOTE — PDOC.HOSPP
- Subjective


Encounter Date: 12/21/20


Encounter Time: 11:00


Subjective: 


painfull cancer on my face since march





- Objective


Vital Signs & Weight: 


                             Vital Signs (12 hours)











  Temp Pulse Resp BP BP Pulse Ox


 


 12/21/20 10:50   71  12   125/70  94 L


 


 12/21/20 09:52     139/76  


 


 12/21/20 08:14   72  14   143/73 H  93 L


 


 12/21/20 03:14  98.2 F  79  18   132/66  93 L


 


 12/20/20 23:51  98.8 F  67  18   146/83 H  98








                                     Weight











Weight                         181 lb 11.2 oz














Result Diagrams: 


                                 12/21/20 05:19





                                 12/21/20 05:19





Hospitalist ROS





- Medication


Medications: 


Active Medications











Generic Name Dose Route Start Last Admin





  Trade Name Freq  PRN Reason Stop Dose Admin


 


Enoxaparin Sodium  40 mg  12/21/20 09:00  12/21/20 08:03





  Enoxaparin Sodium 40 Mg/0.4 Ml Syringe  SC   40 mg





  0900 KAT   Administration


 


Morphine Sulfate  2 mg  12/20/20 23:16  12/21/20 10:32





  Morphine 2 Mg/Ml Vial  SLOW IVP   2 mg





  Q2H PRN   Administration





  Moderate Pain (4-6)  


 


Ondansetron HCl  4 mg  12/20/20 23:18  12/21/20 05:31





  Ondansetron Pf 4 Mg/2 Ml Vial  IVP   4 mg





  Q6H PRN   Administration





  Nausea/Vomiting  














- Exam


General Appearance: awake alert


General - other findings: approximately 8 cm lobular mass on left face with open

lesions


Neck: no JVD


Heart: RRR, no murmur


Respiratory: CTAB


Gastrointestinal: soft, normal bowel sounds


Extremities: no edema





Hosp A/P


(1) Anemia


Code(s): D64.9 - ANEMIA, UNSPECIFIED   Status: Acute   


Qualifiers: 


   Anemia type: unspecified type   Qualified Code(s): D64.9 - Anemia, 

unspecified   





(2) Constipation


Code(s): K59.00 - CONSTIPATION, UNSPECIFIED   Status: Acute   


Qualifiers: 


   Constipation type: unspecified constipation type   Qualified Code(s): K59.00 

- Constipation, unspecified   





(3) Malignant neoplasm of face


Code(s): C76.0 - MALIGNANT NEOPLASM OF HEAD, FACE AND NECK   Status: Chronic   





(4) Tobacco abuse


Code(s): Z72.0 - TOBACCO USE   Status: Chronic   





- Plan





difficult situation


codiene for pain relief


patient states no one local can help with surgery


has had 2 previous surgeries with recurrence

## 2020-12-21 NOTE — PDOC.HHP
Hospitalist HPI





- History of Present Illness


Facial tumor swelling and pain.


History of Present Illness: 


This is a 55-year-old male patient with a history of right facial tumor who 

presents with ongoing facial pain, weakness for the past couple of weeks.





Of note patient has had a history of recurrent left facial nasopharyngeal 

grooves extending to his face which have been ongoing for about a year now.


He has had recurrent surgeries with tumor move about at this point the tumor has

grown and covered most of the left side of his face which necessitated actually 

removal of his left eye.  At this point the tumor literally obliterated his left

orbit with surface ulcerations and possible cellulitis.





He was admitted here and discharged on 12/3/2020 on account of possible 

cellulitis on his facial ulcers which was managed of antibiotics.  He was 

discharged stable and was asked to follow-up on outpatient basis however he has 

been unable to do so.  The plan was for him to follow-up at [facility] where his 

initial care has been started and to follow-up with maxillofacial surgery for 

flap reconstruction.


He lives at the [facility] with he is able to get some food and shelter 

otherwise has no real support.


He has been in the ED several times for the last couple of weeks on account of 

the same problem.





Today he is in distress and notes that he has been feeling more and more weak 

and was unable to get up from a sitting position while in his care facility.


He has been unable to eat and has been having intermittent headaches with no 

relief with Tylenol.  Given his worsening condition was brought in here again 

and plan was to admit and be evaluated and possibly arrange placement as his 

facility seems to be unable to care for him anymore.








Hospitalist ROS





- Review of Systems


Constitutional: reports: weakness, malaise.  denies: fever, chills, sweats


Eyes: denies: pain


Respiratory: denies: cough, dry, shortness of breath, hemoptysis


Cardiovascular: denies: chest pain, palpitations, orthopnea, paroxysmal noc. 

dyspnea


Gastrointestinal: denies: nausea, vomiting, abdominal pain, diarrhea


Genitourinary: denies: dysuria, frequency, incontinence


Musculoskeletal: denies: neck pain, shoulder pain, arm pain


Neurological: reports: weakness.  denies: numbness, incoordination





Hospitalist History





- Past Medical History


Psych: reports: Addictions


Other Medical History: 





Facial tumor





- Past Surgical History


Past Surgical History: reports: Other


Other Surgical History: 





Tumor resection from nose





- Family History


Family History: reports: no pertinent history





- Social History


Smoking Status: Current every day smoker


Alcohol: reports: None


Drugs: reports: methamphetamine


Living Situation: Other ([place] facility)


Activity level: independent ambulation





- Exam


General Appearance: awake alert


General - other findings: Huge mass on left side of the face obliterating his 

left orbit with ulcers


Heart: RRR, no murmur, no gallops, no rubs


Respiratory: CTAB, no wheezes, no rales, no ronchi


Gastrointestinal: soft, non-tender, non-distended, normal bowel sounds


Extremities: no cyanosis, no clubbing, no edema


Neurological: cranial nerve grossly intact, no focal deficits


Psychiatric: normal affect, normal behavior, A&O x 3





Hospitalist Results





- Labs


Result Diagrams: 


                                 12/21/20 05:19





                                 12/21/20 05:19


Lab results: 


                                        











WBC  13.8 thou/uL (4.8-10.8)  H  12/20/20  18:04    


 


Hgb  8.5 g/dL (14.0-18.0)  L  12/20/20  18:04    


 


Hct  27.5 % (42.0-52.0)  L  12/20/20  18:04    


 


MCV  85.5 fL (78.0-98.0)   12/20/20  18:04    


 


Plt Count  803 thou/uL (130-400)  H  12/20/20  18:04    


 


Neutrophils %  74.0 % (42.0-75.0)   12/20/20  18:04    


 


Sodium  138 mmol/L (136-145)   12/20/20  18:04    


 


Potassium  5.0 mmol/L (3.5-5.1)   12/20/20  18:04    


 


Chloride  106 mmol/L ()   12/20/20  18:04    


 


Carbon Dioxide  24 mmol/L (23-31)   12/20/20  18:04    


 


BUN  17 mg/dL (8.4-25.7)   12/20/20  18:04    


 


Creatinine  1.03 mg/dL (0.7-1.3)   12/20/20  18:04    


 


Glucose  96 mg/dL ()   12/20/20  18:04    


 


Calcium  9.3 mg/dL (7.8-10.44)   12/20/20  18:04    


 


Total Bilirubin  0.2 mg/dL (0.2-1.2)   12/20/20  18:04    


 


AST  11 U/L (5-34)   12/20/20  18:04    


 


ALT  12 U/L (8-55)   12/20/20  18:04    


 


Alkaline Phosphatase  93 U/L ()   12/20/20  18:04    


 


Serum Total Protein  7.1 g/dL (5.8-8.1)   12/20/20  18:04    


 


Albumin  3.5 g/dL (3.4-4.8)   12/20/20  18:04    


 


Lipase  24 U/L (8-78)   12/20/20  18:04    














Hospitalist H&P A/P





- Plan


Plan: 





This a 65-year-old male patient with a history of right facial tumor recurrent 

ED visits presents today with worsening pain in his face and head and weakness.





Left facial tumor


Malignant with possible metastasis to his liver


He has had a long follow-up at [facility] however he is unable to follow-up 

anymore


We will consider when oncology evaluate him to see what input they can give


Pain relief morphine


Monitor





Hypodense hepatic lesions.


Possibly cysts or metastasis


Would appreciate heme-onc input


GI consult in a.m. if indicated.  





Weakness


No focal neurological deficits at the moment.


We will have PT evaluate





VT prophylaxisLovenox


CODE STATUSDNR


DispositionunclearPT/case management evaluation


Consider palliative care input as well.

## 2020-12-22 RX ADMIN — ACETAMINOPHEN AND CODEINE PHOSPHATE PRN TAB: 300; 30 TABLET ORAL at 08:52

## 2020-12-22 RX ADMIN — ASPIRIN SCH MG: 81 TABLET ORAL at 08:52

## 2020-12-22 RX ADMIN — ONDANSETRON PRN MG: 2 INJECTION INTRAMUSCULAR; INTRAVENOUS at 20:17

## 2020-12-22 RX ADMIN — ACETAMINOPHEN AND CODEINE PHOSPHATE PRN TAB: 300; 30 TABLET ORAL at 17:24

## 2020-12-22 RX ADMIN — ACETAMINOPHEN AND CODEINE PHOSPHATE PRN TAB: 300; 30 TABLET ORAL at 22:08

## 2020-12-22 RX ADMIN — ACETAMINOPHEN AND CODEINE PHOSPHATE PRN TAB: 300; 30 TABLET ORAL at 03:33

## 2020-12-22 NOTE — PDOC.HOSPP
- Subjective


Encounter Date: 12/22/20


Encounter Time: 09:13


Subjective: 


facial pain persists





- Objective


Vital Signs & Weight: 


                             Vital Signs (12 hours)











  Temp Pulse Resp BP BP Pulse Ox


 


 12/22/20 08:23  97.8 F  66  18  109/66   97


 


 12/22/20 04:00  98.3 F  72  20   134/70  97


 


 12/22/20 00:00  99.1 F  72  20   116/63  95








                                     Weight











Weight                         181 lb 11.2 oz














I&O: 


                                        











 12/21/20 12/22/20 12/23/20





 06:59 06:59 06:59


 


Intake Total  800 


 


Output Total  0 


 


Balance  800 











Result Diagrams: 


                                 12/21/20 05:19





                                 12/21/20 05:19





Hospitalist ROS





- Medication


Medications: 


Active Medications











Generic Name Dose Route Start Last Admin





  Trade Name Freq  PRN Reason Stop Dose Admin


 


Acetaminophen/Codeine Phosphate  1 tab  12/21/20 10:53  12/22/20 08:52





  Acetaminophen/Codeine 30-300mg Tablet  PO   1 tab





  Q4H PRN   Administration





  Moderate Pain (4-6)  


 


Aspirin  81 mg  12/22/20 09:00  12/22/20 08:52





  Aspirin 81 Mg Enteric Coated Tablet  PO   81 mg





  DAILY [facility]   Administration


 


Enoxaparin Sodium  40 mg  12/21/20 09:00  12/22/20 08:52





  Enoxaparin Sodium 40 Mg/0.4 Ml Syringe  SC   40 mg





  0900 [facility]   Administration


 


Naproxen  250 mg  12/21/20 11:55  12/22/20 02:17





  Naproxen 500 Mg Tab  PO   250 mg





  BID PRN   Administration





  Pain  


 


Nicotine  14 mg  12/21/20 12:00  12/21/20 12:44





  Nicotine 14 Mg Patch  TD   Not Given





  [address] [facility]  


 


Ondansetron HCl  4 mg  12/20/20 23:18  12/21/20 05:31





  Ondansetron Pf 4 Mg/2 Ml Vial  IVP   4 mg





  Q6H PRN   Administration





  Nausea/Vomiting  


 


Ondansetron HCl  4 mg  12/20/20 23:18  12/21/20 12:44





  Ondansetron Odt 4 Mg Tab  PO   4 mg





  Q6H PRN   Administration





  Nausea/Vomiting  














- Exam


General Appearance: awake alert


Eye - other findings: lobular mass L face involving L orbit


Neck: no JVD


Heart: RRR, no murmur


Respiratory: CTAB


Gastrointestinal: soft, normal bowel sounds


Extremities: no edema





Hosp A/P


(1) Malignant neoplasm of face


Code(s): C76.0 - MALIGNANT NEOPLASM OF HEAD, FACE AND NECK   Status: Chronic   





(2) Anemia


Code(s): D64.9 - ANEMIA, UNSPECIFIED   Status: Acute   


Qualifiers: 


   Anemia type: unspecified type   Qualified Code(s): D64.9 - Anemia, 

unspecified   





(3) Constipation


Code(s): K59.00 - CONSTIPATION, UNSPECIFIED   Status: Acute   


Qualifiers: 


   Constipation type: unspecified constipation type   Qualified Code(s): K59.00 

- Constipation, unspecified   





(4) Tobacco abuse


Code(s): Z72.0 - TOBACCO USE   Status: Chronic   





- Plan





difficult situation


on morphine iv for pain relief


patient states no one local can help with surgery


has had 2 previous surgeries with recurrence

## 2020-12-23 RX ADMIN — ACETAMINOPHEN AND CODEINE PHOSPHATE PRN TAB: 300; 30 TABLET ORAL at 11:38

## 2020-12-23 RX ADMIN — ACETAMINOPHEN AND CODEINE PHOSPHATE PRN TAB: 300; 30 TABLET ORAL at 20:25

## 2020-12-23 RX ADMIN — DOCUSATE SODIUM 50 MG AND SENNOSIDES 8.6 MG PRN TAB: 8.6; 5 TABLET, FILM COATED ORAL at 20:25

## 2020-12-23 RX ADMIN — ONDANSETRON PRN MG: 2 INJECTION INTRAMUSCULAR; INTRAVENOUS at 11:41

## 2020-12-23 RX ADMIN — ASPIRIN SCH MG: 81 TABLET ORAL at 07:57

## 2020-12-23 RX ADMIN — ACETAMINOPHEN AND CODEINE PHOSPHATE PRN TAB: 300; 30 TABLET ORAL at 02:25

## 2020-12-23 RX ADMIN — DOCUSATE SODIUM 50 MG AND SENNOSIDES 8.6 MG PRN TAB: 8.6; 5 TABLET, FILM COATED ORAL at 05:30

## 2020-12-23 RX ADMIN — ACETAMINOPHEN AND CODEINE PHOSPHATE PRN TAB: 300; 30 TABLET ORAL at 16:17

## 2020-12-23 RX ADMIN — ACETAMINOPHEN AND CODEINE PHOSPHATE PRN TAB: 300; 30 TABLET ORAL at 07:57

## 2020-12-23 NOTE — PDOC.HOSPP
- Subjective


Encounter Date: 12/23/20


Encounter Time: 08:32


Subjective: 


NO CHANGE, STILL HAS PAIN, DRAINAGE IN LESION ON FACE





- Objective


Vital Signs & Weight: 


                             Vital Signs (12 hours)











  Temp Pulse Resp BP BP Pulse Ox


 


 12/23/20 07:44  97.5 F L  61  20  112/63   97


 


 12/23/20 04:00  97.5 F L  66  18   104/68  97


 


 12/23/20 00:00  97.8 F  66  18   110/62  98


 


 12/22/20 21:00  97.5 F L  72  18  128/69   99








                                     Weight











Weight                         181 lb 11.2 oz














I&O: 


                                        











 12/22/20 12/23/20 12/24/20





 06:59 06:59 06:59


 


Intake Total 800 930 


 


Output Total 0  


 


Balance 800 930 











Result Diagrams: 


                                 12/21/20 05:19





                                 12/21/20 05:19





Hospitalist ROS





- Medication


Medications: 


Active Medications











Generic Name Dose Route Start Last Admin





  Trade Name Freq  PRN Reason Stop Dose Admin


 


Acetaminophen/Codeine Phosphate  1 tab  12/21/20 10:53  12/23/20 07:57





  Acetaminophen/Codeine 30-300mg Tablet  PO   1 tab





  Q4H PRN   Administration





  Moderate Pain (4-6)  


 


Aspirin  81 mg  12/22/20 09:00  12/23/20 07:57





  Aspirin 81 Mg Enteric Coated Tablet  PO   81 mg





  DAILY [facility]   Administration


 


Enoxaparin Sodium  40 mg  12/21/20 09:00  12/23/20 07:58





  Enoxaparin Sodium 40 Mg/0.4 Ml Syringe  SC   40 mg





  0900 [facility]   Administration


 


Morphine Sulfate  4 mg  12/22/20 09:12  12/23/20 04:39





  Morphine 4 Mg/Ml Vial  SLOW IVP   4 mg





  Q4H PRN   Administration





  Pain  


 


Naproxen  250 mg  12/21/20 11:55  12/22/20 02:17





  Naproxen 500 Mg Tab  PO   250 mg





  BID PRN   Administration





  Pain  


 


Nicotine  14 mg  12/21/20 12:00  12/22/20 15:10





  Nicotine 14 Mg Patch  TD   Not Given





  1200 [facility]  


 


Ondansetron HCl  4 mg  12/20/20 23:18  12/22/20 20:17





  Ondansetron Pf 4 Mg/2 Ml Vial  IVP   4 mg





  Q6H PRN   Administration





  Nausea/Vomiting  


 


Ondansetron HCl  4 mg  12/20/20 23:18  12/21/20 12:44





  Ondansetron Odt 4 Mg Tab  PO   4 mg





  Q6H PRN   Administration





  Nausea/Vomiting  


 


Senna/Docusate Sodium  2 tab  12/23/20 04:37  12/23/20 05:30





  Senokot S 8.6-50 Mg Tab  PO   2 tab





  BID PRN   Administration





  Constipation  














- Exam


General Appearance: awake alert


ENT - other findings: LOBULAR MASS WITH EROSIONS, DRAINAGE ON FACE


Neck: no JVD


Heart: RRR, no murmur


Respiratory: CTAB


Gastrointestinal: soft, normal bowel sounds


Extremities: no edema





Hosp A/P


(1) Malignant neoplasm of face


Code(s): C76.0 - MALIGNANT NEOPLASM OF HEAD, FACE AND NECK   Status: Chronic   





(2) Anemia


Code(s): D64.9 - ANEMIA, UNSPECIFIED   Status: Acute   


Qualifiers: 


   Anemia type: unspecified type   Qualified Code(s): D64.9 - Anemia, 

unspecified   





(3) Constipation


Code(s): K59.00 - CONSTIPATION, UNSPECIFIED   Status: Acute   


Qualifiers: 


   Constipation type: unspecified constipation type   Qualified Code(s): K59.00 

- Constipation, unspecified   





(4) Tobacco abuse


Code(s): Z72.0 - TOBACCO USE   Status: Chronic   





- Plan





difficult situation


on morphine iv for pain relief


Transfer to tertiary center in progress

## 2020-12-24 RX ADMIN — ACETAMINOPHEN AND CODEINE PHOSPHATE PRN TAB: 300; 30 TABLET ORAL at 11:20

## 2020-12-24 RX ADMIN — ACETAMINOPHEN AND CODEINE PHOSPHATE PRN TAB: 300; 30 TABLET ORAL at 15:56

## 2020-12-24 RX ADMIN — ASPIRIN SCH MG: 81 TABLET ORAL at 08:29

## 2020-12-24 RX ADMIN — ACETAMINOPHEN AND CODEINE PHOSPHATE PRN TAB: 300; 30 TABLET ORAL at 05:45

## 2020-12-24 RX ADMIN — ACETAMINOPHEN AND CODEINE PHOSPHATE PRN TAB: 300; 30 TABLET ORAL at 20:30

## 2020-12-24 RX ADMIN — ACETAMINOPHEN AND CODEINE PHOSPHATE PRN TAB: 300; 30 TABLET ORAL at 01:27

## 2020-12-24 NOTE — PDOC.HOSPP
- Subjective


Encounter Date: 12/24/20


Encounter Time: 10:30


Subjective: 


Patient seen in follow-up for facial mass.  He reports that he continues to have

pain.





- Objective


Vital Signs & Weight: 


                                     Weight











Weight                         181 lb 11.2 oz














I&O: 


                                        











 12/23/20 12/24/20 12/25/20





 06:59 06:59 06:59


 


Intake Total 930 2070 960


 


Output Total   2000


 


Balance 930 2070 -1040











Result Diagrams: 


                                 12/21/20 05:19





                                 12/21/20 05:19


Additional Labs: 


I reviewed patient's labs and MAR





Hospitalist ROS





- Review of Systems


ENT: reports: other (Facial pain)


Gastrointestinal: denies: nausea, vomiting, abdominal pain, diarrhea, 

constipation, melena, hematochezia


Genitourinary: denies: dysuria, frequency, incontinence, hematuria, retention





- Medication


Medications: 


Active Medications











Generic Name Dose Route Start Last Admin





  Trade Name Freq  PRN Reason Stop Dose Admin


 


Acetaminophen/Codeine Phosphate  1 tab  12/21/20 10:53  12/24/20 15:56





  Acetaminophen/Codeine 30-300mg Tablet  PO   1 tab





  Q4H PRN   Administration





  Moderate Pain (4-6)  


 


Aspirin  81 mg  12/22/20 09:00  12/24/20 08:29





  Aspirin 81 Mg Enteric Coated Tablet  PO   81 mg





  DAILY [facility]   Administration


 


Enoxaparin Sodium  40 mg  12/21/20 09:00  12/24/20 08:32





  Enoxaparin Sodium 40 Mg/0.4 Ml Syringe  SC   Not Given





  0900 [facility]  


 


Morphine Sulfate  3 mg  12/24/20 15:11  12/24/20 18:21





  Morphine 4 Mg/Ml Vial  SLOW IVP   3 mg





  Q4H PRN   Administration





  Moderate Pain (4-6)  


 


Naproxen  250 mg  12/21/20 11:55  12/22/20 02:17





  Naproxen 500 Mg Tab  PO   250 mg





  BID PRN   Administration





  Mild Pain  


 


Nicotine  14 mg  12/21/20 12:00  12/24/20 12:59





  Nicotine 14 Mg Patch  TD   Not Given





  1200 [facility]  


 


Ondansetron HCl  4 mg  12/20/20 23:18  12/23/20 11:41





  Ondansetron Pf 4 Mg/2 Ml Vial  IVP   4 mg





  Q6H PRN   Administration





  Nausea/Vomiting  


 


Ondansetron HCl  4 mg  12/20/20 23:18  12/24/20 01:31





  Ondansetron Odt 4 Mg Tab  PO   4 mg





  Q6H PRN   Administration





  Nausea/Vomiting  


 


Senna/Docusate Sodium  2 tab  12/23/20 04:37  12/23/20 20:25





  Senokot S 8.6-50 Mg Tab  PO   2 tab





  BID PRN   Administration





  Constipation  














- Exam


General Appearance: awake alert


Eye: anicteric sclera


ENT: normocephalic atraumatic


Neck: supple


Heart: RRR


Respiratory: CTAB


Gastrointestinal: soft


Extremities: no edema


Skin: no rashes


Psychiatric: normal affect, normal behavior





Hosp A/P





- Plan





-Assessment


(1) Malignant neoplasm of face


Code(s): C76.0 - MALIGNANT NEOPLASM OF HEAD, FACE AND NECK   Status: Chronic   





(2) Anemia


Code(s): D64.9 - ANEMIA, UNSPECIFIED   Status: Acute   


Qualifiers: 


   Anemia type: unspecified type   Qualified Code(s): D64.9 - Anemia, unsp

ecified   





(3) Constipation


Code(s): K59.00 - CONSTIPATION, UNSPECIFIED   Status: Acute   


Qualifiers: 


   Constipation type: unspecified constipation type   Qualified Code(s): K59.00 

- Constipation, unspecified   





(4) Tobacco abuse


Code(s): Z72.0 - TOBACCO USE   Status: Chronic   





- Plan


Increase morphine to 3 mg IV every 4 hours as needed.


Awaiting transfer to tertiary center.

## 2020-12-25 RX ADMIN — ACETAMINOPHEN AND CODEINE PHOSPHATE PRN TAB: 300; 30 TABLET ORAL at 22:11

## 2020-12-25 RX ADMIN — ACETAMINOPHEN AND CODEINE PHOSPHATE PRN TAB: 300; 30 TABLET ORAL at 17:18

## 2020-12-25 RX ADMIN — ASPIRIN SCH MG: 81 TABLET ORAL at 07:56

## 2020-12-25 RX ADMIN — ACETAMINOPHEN AND CODEINE PHOSPHATE PRN TAB: 300; 30 TABLET ORAL at 00:23

## 2020-12-25 RX ADMIN — ACETAMINOPHEN AND CODEINE PHOSPHATE PRN TAB: 300; 30 TABLET ORAL at 04:56

## 2020-12-25 RX ADMIN — ACETAMINOPHEN AND CODEINE PHOSPHATE PRN TAB: 300; 30 TABLET ORAL at 14:17

## 2020-12-25 RX ADMIN — ACETAMINOPHEN AND CODEINE PHOSPHATE PRN TAB: 300; 30 TABLET ORAL at 09:21

## 2020-12-25 RX ADMIN — DOCUSATE SODIUM 50 MG AND SENNOSIDES 8.6 MG PRN TAB: 8.6; 5 TABLET, FILM COATED ORAL at 19:47

## 2020-12-25 NOTE — PDOC.HOSPP
- Subjective


Encounter Date: 12/25/20


Encounter Time: 09:35


Subjective: 


Patient is asking whether he can get more pain medications.  He appears 

comfortable.  No pain currently.  He had a spontaneous bleed from the cancer 

site last evening/overnight.  It appears currently stable hemoglobin at 8.1.





- Objective


Vital Signs & Weight: 


                             Vital Signs (12 hours)











  Pulse Resp BP Pulse Ox


 


 12/25/20 08:06  73  16  122/77  97


 


 12/25/20 07:55     93 L








                                     Weight











Weight                         181 lb 11.2 oz














I&O: 


                                        











 12/24/20 12/25/20 12/26/20





 06:59 06:59 06:59


 


Intake Total 2070 960 1870


 


Output Total  2000 1100


 


Balance 2070 -1040 770











Result Diagrams: 


                                 12/21/20 05:19





                                 12/21/20 05:19





Hospitalist ROS





- Medication


Medications: 


Active Medications











Generic Name Dose Route Start Last Admin





  Trade Name Freq  PRN Reason Stop Dose Admin


 


Acetaminophen/Codeine Phosphate  1 tab  12/21/20 10:53  12/25/20 09:21





  Acetaminophen/Codeine 30-300mg Tablet  PO   1 tab





  Q4H PRN   Administration





  Moderate Pain (4-6)  


 


Aspirin  81 mg  12/22/20 09:00  12/25/20 07:56





  Aspirin 81 Mg Enteric Coated Tablet  PO   81 mg





  DAILY [facility]   Administration


 


Enoxaparin Sodium  40 mg  12/21/20 09:00  12/25/20 07:57





  Enoxaparin Sodium 40 Mg/0.4 Ml Syringe  SC   Not Given





  0900 [facility]  


 


Morphine Sulfate  3 mg  12/24/20 15:11  12/25/20 11:35





  Morphine 4 Mg/Ml Vial  SLOW IVP   3 mg





  Q4H PRN   Administration





  Moderate Pain (4-6)  


 


Naproxen  250 mg  12/21/20 11:55  12/22/20 02:17





  Naproxen 500 Mg Tab  PO   250 mg





  BID PRN   Administration





  Mild Pain  


 


Nicotine  14 mg  12/21/20 12:00  12/25/20 11:34





  Nicotine 14 Mg Patch  TD   Not Given





  [address] [facility]  


 


Ondansetron HCl  4 mg  12/20/20 23:18  12/23/20 11:41





  Ondansetron Pf 4 Mg/2 Ml Vial  IVP   4 mg





  Q6H PRN   Administration





  Nausea/Vomiting  


 


Ondansetron HCl  4 mg  12/20/20 23:18  12/24/20 01:31





  Ondansetron Odt 4 Mg Tab  PO   4 mg





  Q6H PRN   Administration





  Nausea/Vomiting  


 


Senna/Docusate Sodium  2 tab  12/23/20 04:37  12/23/20 20:25





  Senokot S 8.6-50 Mg Tab  PO   2 tab





  BID PRN   Administration





  Constipation  














- Exam


General Appearance: NAD, awake alert (1234)


General - other findings: Significant mass on his left side face.


Eye: PERRL


ENT: normocephalic atraumatic


Neck: supple


Heart: RRR, normal peripheral pulses


Respiratory: CTAB, normal chest expansion


Gastrointestinal: soft, normal bowel sounds


Extremities: 1+ LE edema


Neurological: cranial nerve grossly intact, no focal deficits


Psychiatric: A&O x 3





Hosp A/P





- Plan











) Malignant neoplasm of face


Code(s): C76.0 - MALIGNANT NEOPLASM OF HEAD, FACE AND NECK   Status: Chronic   





(2) Anemia


Code(s): D64.9 - ANEMIA, UNSPECIFIED   Status: Acute   


Qualifiers: 


   Anemia type: unspecified type   Qualified Code(s): D64.9 - Anemia, 

unspecified   





(3) Constipation


Code(s): K59.00 - CONSTIPATION, UNSPECIFIED   Status: Acute   


Qualifiers: 


   Constipation type: unspecified constipation type   Qualified Code(s): K59.00 

- Constipation, unspecified   





(4) Tobacco abuse


Code(s): Z72.0 - TOBACCO USE   Status: Chronic   








Patient states that he followed with [facility].  


Facial restructure preceded by excision of the mass has been considered.


He is waiting to be transferred to a tertiary center.





Pain control he is on morphine 2 mg IV every 4 hours as well as Tylenol 3 every 

4 as needed.  Is also getting stool softener as needed.

## 2020-12-26 LAB
ANION GAP SERPL CALC-SCNC: 12 MMOL/L (ref 10–20)
BUN SERPL-MCNC: 15 MG/DL (ref 8.4–25.7)
CALCIUM SERPL-MCNC: 9.9 MG/DL (ref 7.8–10.44)
CHLORIDE SERPL-SCNC: 100 MMOL/L (ref 98–107)
CO2 SERPL-SCNC: 26 MMOL/L (ref 23–31)
CREAT CL PREDICTED SERPL C-G-VRATE: 96 ML/MIN (ref 70–130)
GLUCOSE SERPL-MCNC: 97 MG/DL (ref 80–115)
POTASSIUM SERPL-SCNC: 4.3 MMOL/L (ref 3.5–5.1)
SODIUM SERPL-SCNC: 134 MMOL/L (ref 136–145)

## 2020-12-26 RX ADMIN — ACETAMINOPHEN AND CODEINE PHOSPHATE PRN TAB: 300; 30 TABLET ORAL at 12:40

## 2020-12-26 RX ADMIN — ACETAMINOPHEN AND CODEINE PHOSPHATE PRN TAB: 300; 30 TABLET ORAL at 03:30

## 2020-12-26 RX ADMIN — ACETAMINOPHEN AND CODEINE PHOSPHATE PRN TAB: 300; 30 TABLET ORAL at 07:56

## 2020-12-26 RX ADMIN — ACETAMINOPHEN AND CODEINE PHOSPHATE PRN TAB: 300; 30 TABLET ORAL at 16:16

## 2020-12-26 RX ADMIN — ASPIRIN SCH MG: 81 TABLET ORAL at 07:57

## 2020-12-26 RX ADMIN — ACETAMINOPHEN AND CODEINE PHOSPHATE PRN TAB: 300; 30 TABLET ORAL at 20:18

## 2020-12-26 RX ADMIN — DOCUSATE SODIUM 50 MG AND SENNOSIDES 8.6 MG PRN TAB: 8.6; 5 TABLET, FILM COATED ORAL at 09:21

## 2020-12-26 NOTE — PDOC.HOSPP
- Subjective


Encounter Date: 12/26/20


Encounter Time: 10:30


Subjective: 


Patient resting.  He is still complaining of pain not controlled but he appears 

well.  I explained that overmedication with analgesics could put him at risk for

fall.  With the fall he can have extensive bleeding from the fascial tumor.  He 

understands.  He also has constipation --will try Fleet enema today.





- Objective


Vital Signs & Weight: 


                             Vital Signs (12 hours)











  Temp Pulse Resp BP Pulse Ox


 


 12/26/20 08:00  97.9 F  77  20  112/67  95


 


 12/26/20 07:41  98.9 F    


 


 12/26/20 07:37   73  19  114/64  93 L


 


 12/26/20 03:39     118/68 








                                     Weight











Weight                         181 lb 11.2 oz














I&O: 


                                        











 12/25/20 12/26/20 12/27/20





 06:59 06:59 06:59


 


Intake Total 960 3380 


 


Output Total 2000 2000 


 


Balance -1040 1380 











Result Diagrams: 


                                 12/21/20 05:19





                                 12/26/20 06:08





Hospitalist ROS





- Medication


Medications: 


Active Medications











Generic Name Dose Route Start Last Admin





  Trade Name Freq  PRN Reason Stop Dose Admin


 


Acetaminophen/Codeine Phosphate  1 tab  12/21/20 10:53  12/26/20 07:56





  Acetaminophen/Codeine 30-300mg Tablet  PO   1 tab





  Q4H PRN   Administration





  Moderate Pain (4-6)  


 


Aspirin  81 mg  12/22/20 09:00  12/26/20 07:57





  Aspirin 81 Mg Enteric Coated Tablet  PO   81 mg





  DAILY [facility]   Administration


 


Enoxaparin Sodium  40 mg  12/21/20 09:00  12/26/20 07:57





  Enoxaparin Sodium 40 Mg/0.4 Ml Syringe  SC   Not Given





  0900 [facility]  


 


Morphine Sulfate  3 mg  12/24/20 15:11  12/26/20 09:20





  Morphine 4 Mg/Ml Vial  SLOW IVP   3 mg





  Q4H PRN   Administration





  Moderate Pain (4-6)  


 


Nicotine  14 mg  12/21/20 12:00  12/26/20 07:58





  Nicotine 14 Mg Patch  TD   Not Given





  [address] [facility]  


 


Ondansetron HCl  4 mg  12/20/20 23:18  12/24/20 01:31





  Ondansetron Odt 4 Mg Tab  PO   4 mg





  Q6H PRN   Administration





  Nausea/Vomiting  


 


Senna/Docusate Sodium  2 tab  12/23/20 04:37  12/26/20 09:21





  Senokot S 8.6-50 Mg Tab  PO   2 tab





  BID PRN   Administration





  Constipation  


 


Sodium Biphosphate/Sodium Phosphate  133 ml  12/26/20 11:00  12/26/20 11:19





  Fleet Enema 133 Ml Bot  HI  12/26/20 13:00  133 ml





  NOW K[facility]   Administration














- Exam


General Appearance: NAD, awake alert


Eye: PERRL


ENT: normocephalic atraumatic


Neck: supple


Heart: RRR, normal peripheral pulses


Respiratory: CTAB, normal chest expansion


Gastrointestinal: soft, normal bowel sounds


Neurological: cranial nerve grossly intact, no focal deficits


Psychiatric: normal affect, normal behavior, A&O x 3





Hosp A/P





- Plan











) Malignant neoplasm of face


Code(s): C76.0 - MALIGNANT NEOPLASM OF HEAD, FACE AND NECK   Status: Chronic   





(2) Anemia


Code(s): D64.9 - ANEMIA, UNSPECIFIED   Status: Acute   


Qualifiers: 


   Anemia type: unspecified type   Qualified Code(s): D64.9 - Anemia, 

unspecified   





(3) Constipation


Code(s): K59.00 - CONSTIPATION, UNSPECIFIED   Status: Acute   


Qualifiers: 


   Constipation type: unspecified constipation type   Qualified Code(s): K59.00 

- Constipation, unspecified   





(4) Tobacco abuse


Code(s): Z72.0 - TOBACCO USE   Status: Chronic   








[facility]  


Facial restructure preceded by excision of the mass has been considered.


He is waiting to be transferred to a tertiary center.





Pain control he is on morphine 2 mg IV every 4 hours as well as Tylenol 3 every 

4 as needed.  


-Will give trial of lidocaine patch as he is complaining still not pain 

controlled





Constipation


-Not resolved with standard stool softener will get a Fleet Enema.

## 2020-12-27 LAB
BASOPHILS # BLD AUTO: 0.1 THOU/UL (ref 0–0.2)
BASOPHILS NFR BLD AUTO: 0.6 % (ref 0–1)
EOSINOPHIL # BLD AUTO: 0.5 THOU/UL (ref 0–0.7)
EOSINOPHIL NFR BLD AUTO: 3.1 % (ref 0–10)
HGB BLD-MCNC: 9.8 G/DL (ref 14–18)
LYMPHOCYTES # BLD: 2.2 THOU/UL (ref 1.2–3.4)
LYMPHOCYTES NFR BLD AUTO: 14 % (ref 21–51)
MCH RBC QN AUTO: 24.1 PG (ref 27–31)
MCV RBC AUTO: 82.8 FL (ref 78–98)
MONOCYTES # BLD AUTO: 1.3 THOU/UL (ref 0.11–0.59)
MONOCYTES NFR BLD AUTO: 8.6 % (ref 0–10)
NEUTROPHILS # BLD AUTO: 11.4 THOU/UL (ref 1.4–6.5)
NEUTROPHILS NFR BLD AUTO: 73.8 % (ref 42–75)
PLATELET # BLD AUTO: 826 THOU/UL (ref 130–400)
RBC # BLD AUTO: 4.07 MILL/UL (ref 4.7–6.1)
WBC # BLD AUTO: 15.4 THOU/UL (ref 4.8–10.8)

## 2020-12-27 RX ADMIN — ACETAMINOPHEN AND CODEINE PHOSPHATE PRN TAB: 300; 30 TABLET ORAL at 16:59

## 2020-12-27 RX ADMIN — ASPIRIN SCH MG: 81 TABLET ORAL at 08:43

## 2020-12-27 RX ADMIN — ACETAMINOPHEN AND CODEINE PHOSPHATE PRN TAB: 300; 30 TABLET ORAL at 08:43

## 2020-12-27 RX ADMIN — ACETAMINOPHEN AND CODEINE PHOSPHATE PRN TAB: 300; 30 TABLET ORAL at 12:48

## 2020-12-27 RX ADMIN — ACETAMINOPHEN AND CODEINE PHOSPHATE PRN TAB: 300; 30 TABLET ORAL at 04:35

## 2020-12-27 RX ADMIN — ACETAMINOPHEN AND CODEINE PHOSPHATE PRN TAB: 300; 30 TABLET ORAL at 00:22

## 2020-12-27 RX ADMIN — ACETAMINOPHEN AND CODEINE PHOSPHATE PRN TAB: 300; 30 TABLET ORAL at 20:53

## 2020-12-27 NOTE — PDOC.HOSPP
- Subjective


Encounter Date: 12/27/20


Encounter Time: 09:35


Subjective: 


Patient waiting to get the morphine.  He still has some pain.  Will try with the

local analgesic with the lidocaine patch.





- Objective


Vital Signs & Weight: 


                             Vital Signs (12 hours)











  Temp Pulse Resp BP Pulse Ox


 


 12/27/20 07:35  99.1 F  76  18  125/65  95


 


 12/27/20 04:32  98.3 F  79  18  118/66  93 L








                                     Weight











Weight                         181 lb 11.2 oz














I&O: 


                                        











 12/26/20 12/27/20 12/28/20





 06:59 06:59 06:59


 


Intake Total 3380 800 


 


Output Total 2000 1125 


 


Balance 1380 -325 











Result Diagrams: 


                                 12/27/20 06:11





                                 12/26/20 06:08





Hospitalist ROS





- Medication


Medications: 


Active Medications











Generic Name Dose Route Start Last Admin





  Trade Name Freq  PRN Reason Stop Dose Admin


 


Acetaminophen/Codeine Phosphate  1 tab  12/21/20 10:53  12/27/20 12:48





  Acetaminophen/Codeine 30-300mg Tablet  PO   1 tab





  Q4H PRN   Administration





  Moderate Pain (4-6)  


 


Aspirin  81 mg  12/22/20 09:00  12/27/20 08:43





  Aspirin 81 Mg Enteric Coated Tablet  PO   81 mg





  DAILY [facility]   Administration


 


Enoxaparin Sodium  40 mg  12/21/20 09:00  12/27/20 08:44





  Enoxaparin Sodium 40 Mg/0.4 Ml Syringe  SC   Not Given





  0900 [facility]  


 


Lidocaine  1 patch  12/27/20 09:00  12/27/20 08:45





  Lidocaine 5% Patch  TD  12/28/20 10:00  1 patch





  DAILY KAT   Administration


 


Morphine Sulfate  3 mg  12/24/20 15:11  12/27/20 10:54





  Morphine 4 Mg/Ml Vial  SLOW IVP   3 mg





  Q4H PRN   Administration





  Moderate Pain (4-6)  


 


Nicotine  14 mg  12/21/20 12:00  12/27/20 11:26





  Nicotine 14 Mg Patch  TD   Not Given





  1200 [facility]  


 


Ondansetron HCl  4 mg  12/20/20 23:18  12/24/20 01:31





  Ondansetron Odt 4 Mg Tab  PO   4 mg





  Q6H PRN   Administration





  Nausea/Vomiting  


 


Senna/Docusate Sodium  2 tab  12/23/20 04:37  12/26/20 09:21





  Senokot S 8.6-50 Mg Tab  PO   2 tab





  BID PRN   Administration





  Constipation  














- Exam


General Appearance: NAD, awake alert


Eye: PERRL


Eye - other findings: Left eye completely close to due to the facial tumor


Neck: supple


Heart: RRR


Respiratory: CTAB, normal chest expansion


Gastrointestinal: soft, normal bowel sounds


Neurological: no focal deficits


Psychiatric: A&O x 3





Hosp A/P





- Plan











) Malignant neoplasm of face


Code(s): C76.0 - MALIGNANT NEOPLASM OF HEAD, FACE AND NECK   Status: Chronic   





(2) Anemia


Code(s): D64.9 - ANEMIA, UNSPECIFIED   Status: Acute   


Qualifiers: 


   Anemia type: unspecified type   Qualified Code(s): D64.9 - Anemia, 

unspecified   





(3) Constipation


Code(s): K59.00 - CONSTIPATION, UNSPECIFIED   Status: Acute   


Qualifiers: 


   Constipation type: unspecified constipation type   Qualified Code(s): K59.00 

- Constipation, unspecified   





(4) Tobacco abuse


Code(s): Z72.0 - TOBACCO USE   Status: Chronic   








Patient states that [facility].  


Facial restructure preceded by excision of the mass has been considered.


He is waiting to be transferred to a tertiary center.





Pain control he is on morphine 2 mg IV every 4 hours as well as Tylenol 3 every 

4 as needed.  


-Will give trial of lidocaine patch as he is complaining still not pain 

controlled





Constipation


-Not resolved with standard stool softener 


- Fleet Enema.

## 2020-12-28 LAB
BASOPHILS # BLD AUTO: 0.1 THOU/UL (ref 0–0.2)
BASOPHILS NFR BLD AUTO: 0.6 % (ref 0–1)
EOSINOPHIL # BLD AUTO: 0.5 THOU/UL (ref 0–0.7)
EOSINOPHIL NFR BLD AUTO: 3.8 % (ref 0–10)
HGB BLD-MCNC: 8.4 G/DL (ref 14–18)
LYMPHOCYTES # BLD: 2 THOU/UL (ref 1.2–3.4)
LYMPHOCYTES NFR BLD AUTO: 15.8 % (ref 21–51)
MCH RBC QN AUTO: 25.7 PG (ref 27–31)
MCV RBC AUTO: 83 FL (ref 78–98)
MONOCYTES # BLD AUTO: 1.1 THOU/UL (ref 0.11–0.59)
MONOCYTES NFR BLD AUTO: 9 % (ref 0–10)
NEUTROPHILS # BLD AUTO: 8.7 THOU/UL (ref 1.4–6.5)
NEUTROPHILS NFR BLD AUTO: 70.8 % (ref 42–75)
PLATELET # BLD AUTO: 676 THOU/UL (ref 130–400)
RBC # BLD AUTO: 3.25 MILL/UL (ref 4.7–6.1)
WBC # BLD AUTO: 12.3 THOU/UL (ref 4.8–10.8)

## 2020-12-28 RX ADMIN — ACETAMINOPHEN AND CODEINE PHOSPHATE PRN TAB: 300; 30 TABLET ORAL at 04:59

## 2020-12-28 RX ADMIN — ACETAMINOPHEN AND CODEINE PHOSPHATE PRN TAB: 300; 30 TABLET ORAL at 09:00

## 2020-12-28 RX ADMIN — ACETAMINOPHEN AND CODEINE PHOSPHATE PRN TAB: 300; 30 TABLET ORAL at 17:09

## 2020-12-28 RX ADMIN — ACETAMINOPHEN AND CODEINE PHOSPHATE PRN TAB: 300; 30 TABLET ORAL at 01:01

## 2020-12-28 RX ADMIN — ACETAMINOPHEN AND CODEINE PHOSPHATE PRN TAB: 300; 30 TABLET ORAL at 21:17

## 2020-12-28 RX ADMIN — ASPIRIN SCH MG: 81 TABLET ORAL at 07:26

## 2020-12-28 RX ADMIN — ACETAMINOPHEN AND CODEINE PHOSPHATE PRN TAB: 300; 30 TABLET ORAL at 13:04

## 2020-12-28 RX ADMIN — LIDOCAINE 4% SCH GM: 4 CREAM TOPICAL at 20:28

## 2020-12-28 NOTE — PDOC.HOSPP
- Subjective


Encounter Date: 12/28/20


Encounter Time: 09:50


Subjective: 


Patient in his usual state.  There is pain still on the tumor side.  Lidocaine 

patch is not helping much as it is falling off.  Will try lidocaine topical.  

Wound care consult as the tumor seems to be leaking some white fluid.  I also 

talked to the transfer center and it appears administration is working on the 

approval.  Based on the studies that was done in the past it does not appear 

that that he has metastatic tumorl.





- Objective


Vital Signs & Weight: 


                             Vital Signs (12 hours)











  Temp Pulse Resp BP Pulse Ox


 


 12/28/20 07:34  98.7 F  67  18  119/65  97








                                     Weight











Admit Weight                   181 lb 11.2 oz


 


Weight                         181 lb 11.2 oz














I&O: 


                                        











 12/27/20 12/28/20 12/29/20





 06:59 06:59 06:59


 


Intake Total 800 2400 


 


Output Total 1125 2750 


 


Balance -325 -350 











Result Diagrams: 


                                 12/28/20 06:11





                                 12/26/20 06:08





Hospitalist ROS





- Medication


Medications: 


Active Medications











Generic Name Dose Route Start Last Admin





  Trade Name Freq  PRN Reason Stop Dose Admin


 


Acetaminophen/Codeine Phosphate  1 tab  12/21/20 10:53  12/28/20 13:04





  Acetaminophen/Codeine 30-300mg Tablet  PO   1 tab





  Q4H PRN   Administration





  Moderate Pain (4-6)  


 


Aspirin  81 mg  12/22/20 09:00  12/28/20 07:26





  Aspirin 81 Mg Enteric Coated Tablet  PO   81 mg





  DAILY KAT   Administration


 


Enoxaparin Sodium  40 mg  12/21/20 09:00  12/28/20 07:26





  Enoxaparin Sodium 40 Mg/0.4 Ml Syringe  SC   Not Given





  [facility]  


 


Morphine Sulfate  3 mg  12/24/20 15:11  12/28/20 11:21





  Morphine 4 Mg/Ml Vial  SLOW IVP   3 mg





  Q4H PRN   Administration





  Moderate Pain (4-6)  


 


Nicotine  14 mg  12/21/20 12:00  12/28/20 09:04





  Nicotine 14 Mg Patch  TD   Not Given





  [facility]  


 


Ondansetron HCl  4 mg  12/20/20 23:18  12/24/20 01:31





  Ondansetron Odt 4 Mg Tab  PO   4 mg





  Q6H PRN   Administration





  Nausea/Vomiting  


 


Senna/Docusate Sodium  2 tab  12/23/20 04:37  12/26/20 09:21





  Senokot S 8.6-50 Mg Tab  PO   2 tab





  BID PRN   Administration





  Constipation  














- Exam


General Appearance: NAD, awake alert


Eye: PERRL


Eye - other findings: Tumor on the left face white discharge


ENT: normocephalic atraumatic


Neck: supple


Heart: RRR


Respiratory: CTAB, normal chest expansion


Gastrointestinal: soft, normal bowel sounds


Neurological: cranial nerve grossly intact, no focal deficits


Musculoskeletal: generalized weakness


Psychiatric: normal affect, normal behavior, A&O x 3





Hosp A/P





- Plan











) Malignant neoplasm of face


Code(s): C76.0 - MALIGNANT NEOPLASM OF HEAD, FACE AND NECK   Status: Chronic   





(2) Anemia


Code(s): D64.9 - ANEMIA, UNSPECIFIED   Status: Acute   


Qualifiers: 


   Anemia type: unspecified type   Qualified Code(s): D64.9 - Anemia, 

unspecified   





(3) Constipation


Code(s): K59.00 - CONSTIPATION, UNSPECIFIED   Status: Acute   


Qualifiers: 


   Constipation type: unspecified constipation type   Qualified Code(s): K59.00 

- Constipation, unspecified   





(4) Tobacco abuse


Code(s): Z72.0 - TOBACCO USE   Status: Chronic   








Patient states that he followed with [facility].  


Facial restructure preceded by excision of the mass has been considered.


He is waiting to be transferred to a tertiary center.





Pain control he is on morphine 2 mg IV every 4 hours as well as Tylenol 3 every 

4 as needed.  


-Will give trial of lidocaine patch as he is complaining still not pain 

controlled





Constipation


-Not resolved with standard stool softener 


- Fleet Enema.





28th


 Lidocaine patch is not helping much as it is falling off.  Will try lidocaine 

topical.  Wound care consult as the tumor seems to be leaking some white fluid. 

I also talked to the transfer center and it appears administration is working on

the approval.  Based on the studies that was done in the past it does not appear

that that he has metastatic cancer.





CT chest showed severe COPD changes with emphysematous and bullous changes in 

the upper lobes no evidence of pulmonary embolism.  This was done on December 8.





Abdominal and pelvic CT showed hypodense hepatic lesions and very small in size 

possibly multiple hepatic cyst however cystic metastatic disease would be 

difficult to entirely exclude.  No acute findings in the abdomen or pelvis.  Was

done on December 20





ENT saw him on December 2


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

[facility] in [place].  Based on the findings it appears that he needs large 

craniofacial resection including tylectomy maxillectomy and potentially 

enucleation of the left eye and resection of the orbital bone.  This should be 

done in the tertiary referral Medical Center and requires composite free flap 

reconstruction."





With this recommendation we are waiting for tertiary center transfer.  I talked 

to the transfer center this morning.  Want to know whether patient has metas

tatic disease. based on the review of the charts in the system it does not 

appear that he has metastatic disease.





The wound started to have some leakage I consulted wound care.  To control the 

pain will apply lidocaine topical twice a day in addition to p.o. analgesic.


Pending transfer to tertiary center.

## 2020-12-28 NOTE — PDOC.EVN
Event Note





- Event Note


Event Note: 


Patient upset regarding wound on his face.  


Has been removing dressing applied by wound care which then lead to bleeding. 


Per RN, wound is no longer bleeding at present.   


Lovenox discontinued.  Wound care following.


Monitor H/H.

## 2020-12-29 LAB
BASOPHILS # BLD AUTO: 0 THOU/UL (ref 0–0.2)
BASOPHILS NFR BLD AUTO: 0.3 % (ref 0–1)
EOSINOPHIL # BLD AUTO: 0.5 THOU/UL (ref 0–0.7)
EOSINOPHIL NFR BLD AUTO: 4.4 % (ref 0–10)
HGB BLD-MCNC: 8.4 G/DL (ref 14–18)
LYMPHOCYTES # BLD: 1.6 THOU/UL (ref 1.2–3.4)
LYMPHOCYTES NFR BLD AUTO: 14.7 % (ref 21–51)
MCH RBC QN AUTO: 25.1 PG (ref 27–31)
MCV RBC AUTO: 82.9 FL (ref 78–98)
MONOCYTES # BLD AUTO: 0.9 THOU/UL (ref 0.11–0.59)
MONOCYTES NFR BLD AUTO: 8.3 % (ref 0–10)
NEUTROPHILS # BLD AUTO: 7.9 THOU/UL (ref 1.4–6.5)
NEUTROPHILS NFR BLD AUTO: 72.3 % (ref 42–75)
PLATELET # BLD AUTO: 685 THOU/UL (ref 130–400)
RBC # BLD AUTO: 3.32 MILL/UL (ref 4.7–6.1)
WBC # BLD AUTO: 10.9 THOU/UL (ref 4.8–10.8)

## 2020-12-29 RX ADMIN — ACETAMINOPHEN AND CODEINE PHOSPHATE PRN TAB: 300; 30 TABLET ORAL at 01:16

## 2020-12-29 RX ADMIN — ASPIRIN SCH MG: 81 TABLET ORAL at 09:22

## 2020-12-29 RX ADMIN — ACETAMINOPHEN AND CODEINE PHOSPHATE PRN TAB: 300; 30 TABLET ORAL at 23:24

## 2020-12-29 RX ADMIN — ACETAMINOPHEN AND CODEINE PHOSPHATE PRN TAB: 300; 30 TABLET ORAL at 09:32

## 2020-12-29 RX ADMIN — ACETAMINOPHEN AND CODEINE PHOSPHATE PRN TAB: 300; 30 TABLET ORAL at 13:45

## 2020-12-29 RX ADMIN — LIDOCAINE 4% SCH GM: 4 CREAM TOPICAL at 09:22

## 2020-12-29 RX ADMIN — ACETAMINOPHEN AND CODEINE PHOSPHATE PRN TAB: 300; 30 TABLET ORAL at 05:18

## 2020-12-29 RX ADMIN — LIDOCAINE 4% SCH: 4 CREAM TOPICAL at 19:50

## 2020-12-29 RX ADMIN — ACETAMINOPHEN AND CODEINE PHOSPHATE PRN TAB: 300; 30 TABLET ORAL at 19:21

## 2020-12-29 NOTE — PDOC.HOSPP
- Subjective


Encounter Date: 12/29/20


Encounter Time: 15:00


Subjective: 


Has some bleeding from the tumor site.  He is also not following nursing advise 

not to  the clot area.  He is also standing under the shower for 

extensive amount of time.  It had intermittent bleed  for the last 2-3 nights 


today it is more profuse.  Talk to Dr. Clare PALUMBO and unfortunately he would not 

be able to help with the bleed.  I talked to the transfer center and they are 

waiting for [facility] administration to approve him.





- Objective


Vital Signs & Weight: 


                             Vital Signs (12 hours)











  Temp Pulse Resp BP Pulse Ox


 


 12/29/20 07:53  97.8 F  71  18  122/67  94 L








                                     Weight











Admit Weight                   181 lb 11.2 oz


 


Weight                         181 lb 11.2 oz














I&O: 


                                        











 12/28/20 12/29/20 12/30/20





 06:59 06:59 06:59


 


Intake Total 2400 1210 


 


Output Total 2750  


 


Balance -350 1210 











Result Diagrams: 


                                 12/29/20 06:00





                                 12/26/20 06:08





Hospitalist ROS





- Medication


Medications: 


Active Medications











Generic Name Dose Route Start Last Admin





  Trade Name Freq  PRN Reason Stop Dose Admin


 


Acetaminophen/Codeine Phosphate  1 tab  12/21/20 10:53  12/29/20 13:45





  Acetaminophen/Codeine 30-300mg Tablet  PO   1 tab





  Q4H PRN   Administration





  Moderate Pain (4-6)  


 


Aspirin  81 mg  12/22/20 09:00  12/29/20 09:22





  Aspirin 81 Mg Enteric Coated Tablet  PO   81 mg





  DAILY KAT   Administration


 


Lidocaine HCl  0 gm  12/28/20 21:00  12/29/20 09:22





  Lidocaine 4% Cream 5 Gm Tube W/ Tegaderm  TOP   5 gm





  BID KAT   Administration


 


Morphine Sulfate  3 mg  12/24/20 15:11  12/29/20 14:27





  Morphine 4 Mg/Ml Vial  SLOW IVP   3 mg





  Q4H PRN   Administration





  Moderate Pain (4-6)  


 


Nicotine  14 mg  12/21/20 12:00  12/29/20 11:21





  Nicotine 14 Mg Patch  TD   Not Given





  1200 KAT  


 


Ondansetron HCl  4 mg  12/20/20 23:18  12/24/20 01:31





  Ondansetron Odt 4 Mg Tab  PO   4 mg





  Q6H PRN   Administration





  Nausea/Vomiting  


 


Senna/Docusate Sodium  2 tab  12/23/20 04:37  12/26/20 09:21





  Senokot S 8.6-50 Mg Tab  PO   2 tab





  BID PRN   Administration





  Constipation  














- Exam


General Appearance: NAD, awake alert, ill appearing


Eye: PERRL


ENT - other findings: Significant bleed almost a tablespoon from the tumor wound


Neck: supple


Heart: RRR


Respiratory: CTAB


Gastrointestinal: soft, normal bowel sounds


Neurological: cranial nerve grossly intact, no focal deficits


Musculoskeletal: generalized weakness


Psychiatric: A&O x 3





Hosp A/P





- Plan











) Malignant neoplasm of face


Code(s): C76.0 - MALIGNANT NEOPLASM OF HEAD, FACE AND NECK   Status: Chronic   





(2) Anemia


Code(s): D64.9 - ANEMIA, UNSPECIFIED   Status: Acute   


Qualifiers: 


   Anemia type: unspecified type   Qualified Code(s): D64.9 - Anemia, 

unspecified   





(3) Constipation


Code(s): K59.00 - CONSTIPATION, UNSPECIFIED   Status: Acute   


Qualifiers: 


   Constipation type: unspecified constipation type   Qualified Code(s): K59.00 

- Constipation, unspecified   





(4) Tobacco abuse


Code(s): Z72.0 - TOBACCO USE   Status: Chronic   








Patient states that he followed with [facility].  


Facial restructure preceded by excision of the mass has been considered.


He is waiting to be transferred to a tertiary center.





Pain control he is on morphine 2 mg IV every 4 hours as well as Tylenol 3 every 

4 as needed.  


-Will give trial of lidocaine patch as he is complaining still not pain 

controlled





Constipation


-Not resolved with standard stool softener 


- Fleet Enema.





28th


 Lidocaine patch is not helping much as it is falling off.  Will try lidocaine 

topical.  Wound care consult as the tumor seems to be leaking some white fluid. 

I also talked to the transfer center and it appears administration is working on

the approval.  Based on the studies that was done in the past it does not appear

that that he has metastatic cancer.





CT chest showed severe COPD changes with emphysematous and bullous changes in 

the upper lobes no evidence of pulmonary embolism.  This was done on December 8.





Abdominal and pelvic CT showed hypodense hepatic lesions and very small in size 

possibly multiple hepatic cyst however cystic metastatic disease would be 

difficult to entirely exclude.  No acute findings in the abdomen or pelvis.  Was

done on December 20





ENT saw him on December 2


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

[facility] in [place].  Based on the findings it appears that he needs large 

craniofacial resection including tylectomy maxillectomy and potentially 

enucleation of the left eye and resection of the orbital bone.  This should be 

done in the tertiary referral Medical Center and requires composite free flap 

reconstruction."





With this recommendation we are waiting for tertiary center transfer.  I talked 

to the transfer center this morning.  Want to know whether patient has 

metastatic disease. based on the review of the charts in the system it does not 

appear that he has metastatic disease.





The wound started to have some leakage I consulted wound care.  To control the 

pain will apply lidocaine topical twice a day in addition to p.o. analgesic.


Pending transfer to tertiary center.





29th


I called the transfer center and this morning it appears that they are waiting 

for the [facility] administration to approve him.  Plus they also  do not have 

bed available.


I talked to Dr. Sparks.


If the bleeding worsens I need to contact interventional radiologist and see 

whether they would be able to do some arterial embolization.


Meanwhile I hope to hear from [facility] hopefully tomorrow.





Another option is for the patient to go to [place].  He has no family support 

who would take him there.  I do not believe patient able to drive in this 

situation by himself to [place].

## 2020-12-30 LAB
ALBUMIN SERPL BCG-MCNC: 3.2 G/DL (ref 3.4–4.8)
ALP SERPL-CCNC: 88 U/L (ref 40–110)
ALT SERPL W P-5'-P-CCNC: 13 U/L (ref 8–55)
AMYLASE SERPL-CCNC: 35 U/L (ref 25–125)
AST SERPL-CCNC: 13 U/L (ref 5–34)
BILIRUB DIRECT SERPL-MCNC: 0.1 MG/DL (ref 0.1–0.3)
BILIRUB SERPL-MCNC: 0.2 MG/DL (ref 0.2–1.2)
LIPASE SERPL-CCNC: 12 U/L (ref 8–78)

## 2020-12-30 RX ADMIN — HYDROCODONE BITARTRATE AND ACETAMINOPHEN PRN TAB: 10; 325 TABLET ORAL at 12:10

## 2020-12-30 RX ADMIN — ACETAMINOPHEN AND CODEINE PHOSPHATE PRN TAB: 300; 30 TABLET ORAL at 03:30

## 2020-12-30 RX ADMIN — LIDOCAINE 4% SCH: 4 CREAM TOPICAL at 20:04

## 2020-12-30 RX ADMIN — Medication SCH ML: at 20:04

## 2020-12-30 RX ADMIN — LIDOCAINE 4% SCH: 4 CREAM TOPICAL at 09:08

## 2020-12-30 RX ADMIN — HYDROCODONE BITARTRATE AND ACETAMINOPHEN PRN TAB: 10; 325 TABLET ORAL at 16:06

## 2020-12-30 RX ADMIN — ACETAMINOPHEN AND CODEINE PHOSPHATE PRN TAB: 300; 30 TABLET ORAL at 08:17

## 2020-12-30 RX ADMIN — HYDROCODONE BITARTRATE AND ACETAMINOPHEN PRN TAB: 10; 325 TABLET ORAL at 20:07

## 2020-12-30 NOTE — PDOC.HOSPP
- Subjective


Encounter Date: 12/30/20


Encounter Time: 09:00


Subjective: 


Patient's bleeding seems to be stopped.  He has a dressing on his face.  It 

appears well.  I have explained since yesterday afternoon what we have done so 

far and trying to transfer him to a different facility.  Patient expressed his 

understanding.  I will wait to hear from [facility].  Please see more details of

the work-up for today in my plan





- Objective


Vital Signs & Weight: 


                             Vital Signs (12 hours)











  Temp Pulse Resp BP Pulse Ox


 


 12/30/20 07:59  98.4 F  69  20  109/61  94 L








                                     Weight











Admit Weight                   181 lb 11.2 oz


 


Weight                         181 lb 11.2 oz














I&O: 


                                        











 12/29/20 12/30/20 12/31/20





 06:59 06:59 06:59


 


Intake Total 1210 1120 


 


Balance 1210 1120 











Result Diagrams: 


                                 12/29/20 06:00





                                 12/26/20 06:08





Hospitalist ROS





- Medication


Medications: 


Active Medications











Generic Name Dose Route Start Last Admin





  Trade Name Freq  PRN Reason Stop Dose Admin


 


Hydrocodone Bitart/Acetaminophen  1 tab  12/30/20 11:02  12/30/20 12:10





  Hydrocodone/Acetaminophen 10/325 Mg Tablet  PO   1 tab





  Q4H PRN   Administration





  Pain  


 


Lidocaine HCl  0 gm  12/28/20 21:00  12/30/20 09:08





  Lidocaine 4% Cream 5 Gm Tube W/ Tegaderm  TOP   Not Given





  BID KAT  


 


Nicotine  14 mg  12/21/20 12:00  12/30/20 12:09





  Nicotine 14 Mg Patch  TD   Not Given





  1200 KAT  


 


Ondansetron HCl  4 mg  12/20/20 23:18  12/24/20 01:31





  Ondansetron Odt 4 Mg Tab  PO   4 mg





  Q6H PRN   Administration





  Nausea/Vomiting  


 


Senna/Docusate Sodium  2 tab  12/23/20 04:37  12/26/20 09:21





  Senokot S 8.6-50 Mg Tab  PO   2 tab





  BID PRN   Administration





  Constipation  














- Exam


General Appearance: NAD, awake alert


Eye: PERRL


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


ENT: normocephalic atraumatic


ENT - other findings: Right facial tumor wound in dressing.


Neck: supple


Heart: RRR


Respiratory: CTAB, normal chest expansion


Gastrointestinal: soft, normal bowel sounds


Psychiatric: normal affect, normal behavior, A&O x 3





Hosp A/P





- Plan











) Malignant neoplasm of face


Code(s): C76.0 - MALIGNANT NEOPLASM OF HEAD, FACE AND NECK   Status: Chronic   





(2) Anemia


Code(s): D64.9 - ANEMIA, UNSPECIFIED   Status: Acute   


Qualifiers: 


   Anemia type: unspecified type   Qualified Code(s): D64.9 - Anemia, 

unspecified   





(3) Constipation


Code(s): K59.00 - CONSTIPATION, UNSPECIFIED   Status: Acute   


Qualifiers: 


   Constipation type: unspecified constipation type   Qualified Code(s): K59.00 

- Constipation, unspecified   





(4) Tobacco abuse


Code(s): Z72.0 - TOBACCO USE   Status: Chronic   








Patient states that he followed with [facility].  


Facial restructure preceded by excision of the mass has been considered.


He is waiting to be transferred to a tertiary center.





Pain control he is on morphine 2 mg IV every 4 hours as well as Tylenol 3 every 

4 as needed.  


-Will give trial of lidocaine patch as he is complaining still not pain 

controlled





Constipation


-Not resolved with standard stool softener 


- Fleet Enema.





28th


 Lidocaine patch is not helping much as it is falling off.  Will try lidocaine 

topical.  Wound care consult as the tumor seems to be leaking some white fluid. 

I also talked to the transfer center and it appears administration is working on

the approval.  Based on the studies that was done in the past it does not appear

that that he has metastatic cancer.





CT chest showed severe COPD changes with emphysematous and bullous changes in 

the upper lobes no evidence of pulmonary embolism.  This was done on December 8.





Abdominal and pelvic CT showed hypodense hepatic lesions and very small in size 

possibly multiple hepatic cyst however cystic metastatic disease would be 

difficult to entirely exclude.  No acute findings in the abdomen or pelvis.  Was

done on December 20





ENT saw him on December 2


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

[facility] in [place].  Based on the findings it appears that he needs large 

craniofacial resection including tylectomy maxillectomy and potentially 

enucleation of the left eye and resection of the orbital bone.  This should be 

done in the tertiary referral Medical Center and requires composite free flap 

reconstruction."





With this recommendation we are waiting for tertiary center transfer.  I talked 

to the transfer center this morning.  Want to know whether patient has 

metastatic disease. based on the review of the charts in the system it does not 

appear that he has metastatic disease.





The wound started to have some leakage I consulted wound care.  To control the 

pain will apply lidocaine topical twice a day in addition to p.o. analgesic.


Pending transfer to tertiary center.





29th


I called the transfer center and this morning it appears that they are waiting 

for the [facility] administration to approve him.  Plus they also  do not have 

bed available.


I talked to Dr. Sparks.


If the bleeding worsens I need to contact interventional radiologist and see 

whether they would be able to do some arterial embolization.


Meanwhile I hope to hear from [facility] hopefully tomorrow.





Another option is for the patient to go to [place].  He has no family support 

who would take him there.  I do not believe patient able to drive in this 

situation by himself to [place].





30th


-I talked to Dr. Espino vascular surgery last evening and he is kind enough to 

see the patient.  Patient had some procedures done including enucleation of the 

left eye at [facility] so it would be appropriate that if she goes back to 

the facility where some of the work done already.


However when the transfer center approached them they turned down stating that 

there is no bed available.





I also requested transfer center to contact [facility] in [place] in [place].  MD call

ed me around 7 PM last evening I talked to them,  


 after review of the chart that we faxed to them, they turned down any 

evaluation for acceptance this morning.





I also talked oromaxillofacial surgeon Dr. Love this morning, and he also 

feels that this case would be appropriate for tertiary care center and he he 

would not be able to help much.





I contacted the radiologist to look over the hepatic lesions again.  They are so

small and not amenable for any biopsy.  They even discussed with the MRI 

specialist and the it does not appear to have any vascularity mostly area cystic

lesions.  And they are stable for almost 2 months.  So  these small lesions 

which are so tiny being metastatic is low probability.





I contacted the transfer center again and I let them know that patient does not 

have any metastatic lesions.  Hopefully [facility] would consider taking him 

since their concern about the metastasis being cleared.





Meanwhile patient is getting quite frustrated that not able to get adequate 

medical help.  I  discussed with him the option of him going personally to 

[facility] by himself in the taxi or with any  friends support

## 2020-12-31 VITALS — SYSTOLIC BLOOD PRESSURE: 118 MMHG | TEMPERATURE: 97.9 F | DIASTOLIC BLOOD PRESSURE: 68 MMHG

## 2020-12-31 RX ADMIN — HYDROCODONE BITARTRATE AND ACETAMINOPHEN PRN TAB: 10; 325 TABLET ORAL at 04:49

## 2020-12-31 RX ADMIN — HYDROCODONE BITARTRATE AND ACETAMINOPHEN PRN TAB: 10; 325 TABLET ORAL at 00:43

## 2020-12-31 RX ADMIN — Medication SCH ML: at 07:51

## 2020-12-31 RX ADMIN — LIDOCAINE 4% SCH: 4 CREAM TOPICAL at 07:50

## 2020-12-31 RX ADMIN — HYDROCODONE BITARTRATE AND ACETAMINOPHEN PRN TAB: 10; 325 TABLET ORAL at 10:16

## 2020-12-31 RX ADMIN — HYDROCODONE BITARTRATE AND ACETAMINOPHEN PRN TAB: 10; 325 TABLET ORAL at 14:00

## 2020-12-31 NOTE — PDOC.DS.DS
Provider





- Provider


Date of Admission: 


12/20/20 22:04





Admitting Provider: 


Henry Cedeno MD





Primary Care Physician: 


Avelina Marrufo NP








Course





- Hospital Course


Hospital Course: 


65-year-old male presented with








) Malignant neoplasm of face


Code(s): C76.0 - MALIGNANT NEOPLASM OF HEAD, FACE AND NECK   Status: Chronic   





(2) Anemia


Code(s): D64.9 - ANEMIA, UNSPECIFIED   Status: Acute   


Qualifiers: 


   Anemia type: unspecified type   Qualified Code(s): D64.9 - Anemia, 

unspecified   





(3) Constipation


Code(s): K59.00 - CONSTIPATION, UNSPECIFIED   Status: Acute   


Qualifiers: 


   Constipation type: unspecified constipation type   Qualified Code(s): K59.00 

- Constipation, unspecified   





(4) Tobacco abuse


Code(s): Z72.0 - TOBACCO USE   Status: Chronic   








Patient states that he followed with [facility].  


Facial restructure preceded by excision of the mass has been considered.


He is waiting to be transferred to a tertiary center.








CT chest showed severe COPD changes with emphysematous and bullous changes in 

the upper lobes no evidence of pulmonary embolism.  This was done on December 8.





Abdominal and pelvic CT showed hypodense hepatic lesions and very small in size 

possibly multiple hepatic cyst however cystic metastatic disease would be 

difficult to entirely exclude.  No acute findings in the abdomen or pelvis.  Was

done on December 20





ENT saw him on December 2


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

[facility] in [place].  Based on the findings it appears that he needs large 

craniofacial resection including tylectomy maxillectomy and potentially 

enucleation of the left eye and resection of the orbital bone.  This should be 

done in the tertiary referral Medical Center and requires composite free flap 

reconstruction."





With this recommendation we are waiting for tertiary center transfer.  I talked 

to the transfer center this morning.  Want to know whether patient has 

metastatic disease. based on the review of the charts in the system it does not 

appear that he has metastatic disease.





29th


I called the transfer center and this morning it appears that they are waiting 

for the [facility] administration to approve him.  Plus they also  do not have 

bed available.


I talked to Dr. Sparks.





Another option is for the patient to go to [place].  He has no family support 

who would take him there.  I do not believe patient able to drive in this 

situation by himself to [place].





30th


-I talked to Dr. Espino vascular surgery last evening and he is kind enough to 

see the patient.  Patient had some procedures done including enucleation of the 

left eye at [facility] so it would be appropriate that if he goes back to 

the facility where some of the work done already.


However when the transfer center approached them they turned down stating that 

there is no bed available.





I also requested transfer center to contact [facility] in [place] in [place].  MD 

called me around 7 PM last evening I talked to them,  


 after review of the chart that we faxed to them, they turned down  evaluation 

for acceptance this morning.





I also talked oromaxillofacial surgeon Dr. Love this morning, and he also 

feels that this case would be appropriate for tertiary care center and he he 

would not be able to help much.





I contacted the radiologist to look over the hepatic lesions again.  Lesions are

so small and not amenable for any biopsy.  Dr. Zelaya, radiologist discussed with

the MRI specialist and lesions do not appear to have any vascularity mostly 

cystic lesions.  And they are stable for almost 2 months.  So  these small 

lesions being metastatic is low probability.





I contacted the transfer center again and I let them know that patient does not 

have any metastatic lesions.  Hopefully [facility] would consider taking him 

since their concern about the metastasis, being cleared.





Meanwhile patient is getting quite frustrated that not able to get adequate 

medical help.  I  discussed with him the option of him going personally to 

[facility] by himself in the taxi or with any  friends support.





31st


Patient will be discharged home today.  I counseled him that he needs to go as 

soon as possible to [facility] call [facility] to take care of this massive facial tumor. 

Advised him to be cautious with the shower as well as to keep his hands away fr

om the wound area.  He agreed that he will be going to [place] soon.


Due to holiday season he does not want to stay any further,  prefers to go home 

and follow-up at [place].





Discharge time over 30 minutes.





Resuscitation Status: 








12/20/20 23:14


Resuscitation Status Routine 


   Resuscitation Status: DNAR: NO Resuscitation


   Discussed with: Discussed with patient











- Labs


Lab Results: 


                                        





                                 12/29/20 06:00 





                                 12/26/20 06:08 





Abnormal Lab Results - Last 48 hrs





12/30/20 09:43: Albumin 3.2 L


12/30/20 09:43: Lactate Dehydrogenase 124 L











- Physical Exam


Vitals: 


                             Vital Signs (12 hours)











  Temp Pulse Resp BP Pulse Ox


 


 12/31/20 07:20  98.5 F  114 H  16  102/65  95








                                     Weight











Admit Weight                   181 lb 11.2 oz


 


Weight                         181 lb 11.2 oz














Physical Exam: 


The patient was seen and examined on the day of discharge.


Patient feels quite frustrated as we are not able to help him much.  He feels 

that at this point that he would rather go home and then go to [facility] versus 

[place].  I have not heard anything from [facility].  With the holiday 

season I understand patient's frustration.  











Plan





- Discharge Medications


Home Medications: 


                                        











 Medication  Instructions  Recorded  Confirmed  Type


 


Naproxen Sodium [Aleve] 220 mg PO BID PRN 09/02/20 12/21/20 History


 


Nicotine [Nicoderm CQ] 14 mg TD Q24HR  patch 12/03/20 12/21/20 Rx


 


traMADol HCl [Ultram] 50 mg PO Q6H PRN  tab 12/03/20 12/21/20 Rx











Allergies: 








No Known Allergies Allergy (Unverified 12/30/20 14:19)


   








- Discharge Instructions


Discharge Instructions:: 


  Please go to [facility] as soon as he can.  Your facial tumor has the 

tendency to bleed - you need to be very careful when you take a shower and do 

not  on the scab tissue.


Follow-up with primary care as well.


Activity:: Activity as Tolerated


Nourishment:: Regular Diet





- Follow up Plan


Referrals: 


Avelina Marrufo NP [Primary Care Provider] - 


Disposition: HOME





Quality





- Care Measures


CORE MEASURES:: N/A

## 2021-01-09 ENCOUNTER — HOSPITAL ENCOUNTER (EMERGENCY)
Dept: HOSPITAL 92 - ERS | Age: 66
Discharge: HOME | End: 2021-01-09
Payer: SELF-PAY

## 2021-01-09 DIAGNOSIS — Z20.822: ICD-10-CM

## 2021-01-09 DIAGNOSIS — R51.9: Primary | ICD-10-CM

## 2021-01-09 DIAGNOSIS — Z85.22: ICD-10-CM

## 2021-01-09 DIAGNOSIS — J44.9: ICD-10-CM

## 2021-01-09 DIAGNOSIS — F17.290: ICD-10-CM

## 2021-01-09 PROCEDURE — U0003 INFECTIOUS AGENT DETECTION BY NUCLEIC ACID (DNA OR RNA); SEVERE ACUTE RESPIRATORY SYNDROME CORONAVIRUS 2 (SARS-COV-2) (CORONAVIRUS DISEASE [COVID-19]), AMPLIFIED PROBE TECHNIQUE, MAKING USE OF HIGH THROUGHPUT TECHNOLOGIES AS DESCRIBED BY CMS-2020-01-R: HCPCS

## 2021-01-09 PROCEDURE — 87635 SARS-COV-2 COVID-19 AMP PRB: CPT

## 2021-01-09 PROCEDURE — 96372 THER/PROPH/DIAG INJ SC/IM: CPT
